# Patient Record
Sex: MALE | Employment: FULL TIME | URBAN - METROPOLITAN AREA
[De-identification: names, ages, dates, MRNs, and addresses within clinical notes are randomized per-mention and may not be internally consistent; named-entity substitution may affect disease eponyms.]

---

## 2017-01-05 ENCOUNTER — CARE COORDINATION (OUTPATIENT)
Dept: NEUROLOGY | Facility: CLINIC | Age: 47
End: 2017-01-05

## 2017-01-05 NOTE — PROGRESS NOTES
Shahida Elias, Shahida Roew, RYAN                   Tried calling Thomas.  Left a vm asking for a call back.                Previous Messages       ----- Message -----      From: Troy Vizcaino MD      Sent: 1/3/2017  10:17 AM        To: Geovany Perez MD, Shahida Elias RN   Subject: Lab tests                                         He showed for ischemic work test then left on learning how long it would take.  No blood draw at all.  No contact with clinic.  Please call and see if he wishes to reschedule testing and follow up in clinic.  jimmy

## 2017-05-15 ENCOUNTER — TELEPHONE (OUTPATIENT)
Dept: NURSING | Facility: CLINIC | Age: 47
End: 2017-05-15

## 2017-05-15 NOTE — TELEPHONE ENCOUNTER
Call Type: Triage Call    Presenting Problem: Requesting a refill of migraine medication.  Caller not with patient and can't remember which [facility] he uses.  Triage Note:  Guideline Title: Medication Questions - Adult  Recommended Disposition: Speak with Provider or Pharmacist  within 24 hours  Original Inclination: Wanted to speak with a nurse  Override Disposition:  Intended Action: Follow advice given  Physician Contacted: No  Requests refill of prescribed medication without valid refills OR requests refill  of prescribed medication with valid refills but does not have prescription number  (no RX container); lack of medication does not put patient at clinical risk ?  YES  Sign(s) or symptom(s) associated with a diagnosed condition or with a new illness  ? NO  Prescription ordered today and not available at pharmacy putting patient at  clinical risk ? NO  Recurrence of a symptom(s) or illness post prescribed medication treatment AND  provider instructed patient to call if symptom(s) returned. ? NO  Unable to obtain prescribed medication related to available resources AND  situation poses immediate clinical risk ? NO  Pharmacy calling to clarify prescription order. ? NO  Requests refill of prescribed medication that does NOT have a valid refill; lack  of medication may cause clinical risk to patient if not available. ? NO  Physician Instructions:  Care Advice:

## 2017-05-16 ENCOUNTER — TELEPHONE (OUTPATIENT)
Dept: FAMILY MEDICINE | Facility: CLINIC | Age: 47
End: 2017-05-16

## 2017-05-16 DIAGNOSIS — G43.009 MIGRAINE WITHOUT AURA AND WITHOUT STATUS MIGRAINOSUS, NOT INTRACTABLE: Primary | ICD-10-CM

## 2017-05-16 RX ORDER — ZOLMITRIPTAN 5 MG/1
5 TABLET, FILM COATED ORAL
Qty: 30 TABLET | Refills: 11 | Status: SHIPPED | OUTPATIENT
Start: 2017-05-16 | End: 2018-09-24

## 2017-05-16 NOTE — TELEPHONE ENCOUNTER
Clinic Action Needed:   Please call back patient to advise ASAP .  Please sent a new Rx for Migraine Rx to [facility] phone [phone] .  ZOLMitriptan (ZOMIG PO)     --   Sig: Take 5 mg by mouth     Reason for Call:  Randi called requesting Rx refill and [facility] has original Rx and left message also .   Patient Recommendations/Teaching: Advised may need to go to Urgent Care or see provider to get new Rx in a  timely way .   Routed to:Sent to PCP's nurse pool.   Renée Jenkins RN, [facility]

## 2017-05-16 NOTE — TELEPHONE ENCOUNTER
ZOLMitriptan (ZOMIG PO)      Last Written Prescription Date:  ?  Last Fill Quantity: ?,   # refills: ?  Last Office Visit with G, P or [facility] prescribing provider: 9/16/2016  Future Office visit:       Routing refill request to provider for review/approval because:  Medication is reported/historical

## 2017-05-17 NOTE — TELEPHONE ENCOUNTER
Notified patient that script was signed and submitted to pharmacy with available refills.     Caitlin Hope- [facility]

## 2017-05-17 NOTE — TELEPHONE ENCOUNTER
Patient called back, has 1/2 pill left is in the middle of migraine episode any questions call cell , patient concerned about getting this filled today

## 2017-09-18 ENCOUNTER — OFFICE VISIT (OUTPATIENT)
Dept: FAMILY MEDICINE | Facility: CLINIC | Age: 47
End: 2017-09-18
Payer: COMMERCIAL

## 2017-09-18 VITALS
OXYGEN SATURATION: 100 % | TEMPERATURE: 97.3 F | WEIGHT: 168 LBS | SYSTOLIC BLOOD PRESSURE: 120 MMHG | HEART RATE: 58 BPM | HEIGHT: 73 IN | BODY MASS INDEX: 22.26 KG/M2 | DIASTOLIC BLOOD PRESSURE: 78 MMHG

## 2017-09-18 DIAGNOSIS — Z12.5 SCREENING FOR PROSTATE CANCER: ICD-10-CM

## 2017-09-18 DIAGNOSIS — Z00.00 ROUTINE GENERAL MEDICAL EXAMINATION AT A HEALTH CARE FACILITY: Primary | ICD-10-CM

## 2017-09-18 DIAGNOSIS — Z13.220 LIPID SCREENING: ICD-10-CM

## 2017-09-18 DIAGNOSIS — G43.009 MIGRAINE WITHOUT AURA AND WITHOUT STATUS MIGRAINOSUS, NOT INTRACTABLE: ICD-10-CM

## 2017-09-18 LAB
ALBUMIN SERPL-MCNC: 4 G/DL (ref 3.4–5)
ALP SERPL-CCNC: 57 U/L (ref 40–150)
ALT SERPL W P-5'-P-CCNC: 34 U/L (ref 0–70)
ANION GAP SERPL CALCULATED.3IONS-SCNC: 7 MMOL/L (ref 3–14)
AST SERPL W P-5'-P-CCNC: 23 U/L (ref 0–45)
BILIRUB SERPL-MCNC: 0.7 MG/DL (ref 0.2–1.3)
BUN SERPL-MCNC: 19 MG/DL (ref 7–30)
CALCIUM SERPL-MCNC: 9.2 MG/DL (ref 8.5–10.1)
CHLORIDE SERPL-SCNC: 104 MMOL/L (ref 94–109)
CHOLEST SERPL-MCNC: 187 MG/DL
CO2 SERPL-SCNC: 29 MMOL/L (ref 20–32)
CREAT SERPL-MCNC: 1 MG/DL (ref 0.66–1.25)
ERYTHROCYTE [DISTWIDTH] IN BLOOD BY AUTOMATED COUNT: 12.7 % (ref 10–15)
GFR SERPL CREATININE-BSD FRML MDRD: 80 ML/MIN/1.7M2
GLUCOSE SERPL-MCNC: 109 MG/DL (ref 70–99)
HCT VFR BLD AUTO: 43.7 % (ref 40–53)
HDLC SERPL-MCNC: 66 MG/DL
HGB BLD-MCNC: 14.7 G/DL (ref 13.3–17.7)
LDLC SERPL CALC-MCNC: 113 MG/DL
MCH RBC QN AUTO: 31.3 PG (ref 26.5–33)
MCHC RBC AUTO-ENTMCNC: 33.6 G/DL (ref 31.5–36.5)
MCV RBC AUTO: 93 FL (ref 78–100)
NONHDLC SERPL-MCNC: 121 MG/DL
PLATELET # BLD AUTO: 184 10E9/L (ref 150–450)
POTASSIUM SERPL-SCNC: 4.3 MMOL/L (ref 3.4–5.3)
PROT SERPL-MCNC: 7.2 G/DL (ref 6.8–8.8)
PSA SERPL-ACNC: 0.43 UG/L (ref 0–4)
RBC # BLD AUTO: 4.69 10E12/L (ref 4.4–5.9)
SODIUM SERPL-SCNC: 140 MMOL/L (ref 133–144)
TRIGL SERPL-MCNC: 42 MG/DL
WBC # BLD AUTO: 4.7 10E9/L (ref 4–11)

## 2017-09-18 PROCEDURE — 80061 LIPID PANEL: CPT | Performed by: INTERNAL MEDICINE

## 2017-09-18 PROCEDURE — 85027 COMPLETE CBC AUTOMATED: CPT | Performed by: INTERNAL MEDICINE

## 2017-09-18 PROCEDURE — 36415 COLL VENOUS BLD VENIPUNCTURE: CPT | Performed by: INTERNAL MEDICINE

## 2017-09-18 PROCEDURE — 80053 COMPREHEN METABOLIC PANEL: CPT | Performed by: INTERNAL MEDICINE

## 2017-09-18 PROCEDURE — G0103 PSA SCREENING: HCPCS | Performed by: INTERNAL MEDICINE

## 2017-09-18 PROCEDURE — 99396 PREV VISIT EST AGE 40-64: CPT | Performed by: INTERNAL MEDICINE

## 2017-09-18 NOTE — PROGRESS NOTES
SUBJECTIVE:   CC: Thomas Yip is an 46 year old male who presents for preventative health visit.     Healthy Habits:    Do you get at least three servings of calcium containing foods daily (dairy, green leafy vegetables, etc.)? yes    Amount of exercise or daily activities, outside of work: 3-4 day(s) per week    Problems taking medications regularly No    Medication side effects: No    Have you had an eye exam in the past two years? yes    Do you see a dentist twice per year? yes    Do you have sleep apnea, excessive snoring or daytime drowsiness?no        Today's PHQ-2 Score:   PHQ-2 ( 1999 [employer]) 9/18/2017 9/16/2016   Q1: Little interest or pleasure in doing things 0 0   Q2: Feeling down, depressed or hopeless 0 0   PHQ-2 Score 0 0         Abuse: Current or Past(Physical, Sexual or Emotional)- No  Do you feel safe in your environment - Yes  Social History   Substance Use Topics     Smoking status: Never Smoker     Smokeless tobacco: Never Used     Alcohol use Yes      Comment: maybe a drink on weekend     The patient does not drink >3 drinks per day nor >7 drinks per week.    Last PSA: No results found for: PSA    Reviewed orders with patient. Reviewed health maintenance and updated orders accordingly - Yes  Patient Active Problem List   Diagnosis     Myalgia and myositis     Migraine without aura and without status migrainosus, not intractable     Past Surgical History:   Procedure Laterality Date     ORTHOPEDIC SURGERY Right     ACL/MCL/Meniscus       Social History   Substance Use Topics     Smoking status: Never Smoker     Smokeless tobacco: Never Used     Alcohol use Yes      Comment: maybe a drink on weekend     Family History   Problem Relation Age of Onset     Breast Cancer Mother      Arthritis Mother      Hypertension Father          Current Outpatient Prescriptions   Medication Sig Dispense Refill     ZOLMitriptan (ZOMIG) 5 MG tablet Take 1 tablet (5 mg) by mouth at onset of headache for migraine  "30 tablet 11     No Known Allergies      Reviewed and updated as needed this visit by clinical staff  Tobacco  Allergies  Med Hx  Surg Hx  Fam Hx  Soc Hx        Reviewed and updated as needed this visit by Provider  Tobacco  Med Hx  Surg Hx  Fam Hx  Soc Hx       Past Medical History:   Diagnosis Date     Migraine      Myalgia and myositis 9/24/2014      Past Surgical History:   Procedure Laterality Date     ORTHOPEDIC SURGERY Right     ACL/MCL/Meniscus       ROS:  A 12 organ systems ROS is negative    OBJECTIVE:   /78 (BP Location: Right arm, Patient Position: Chair, Cuff Size: Adult Regular)  Pulse 58  Temp 97.3  F (36.3  C) (Oral)  Ht 6' 0.5\" (1.842 m)  Wt 168 lb (76.2 kg)  SpO2 100%  BMI 22.47 kg/m2  EXAM:  GENERAL: healthy, alert and no distress  EYES: Eyes grossly normal to inspection, PERRL and conjunctivae and sclerae normal  HENT: ear canals and TM's normal, nose and mouth without ulcers or lesions  NECK: no adenopathy, no asymmetry, masses, or scars and thyroid normal to palpation  RESP: lungs clear to auscultation - no rales, rhonchi or wheezes  CV: regular rate and rhythm, normal S1 S2, no S3 or S4, no murmur, click or rub, no peripheral edema and peripheral pulses strong  ABDOMEN: soft, nontender, no hepatosplenomegaly, no masses and bowel sounds normal  RECTAL: normal sphincter tone, no rectal masses, prostate normal size, smooth, nontender without nodules or masses  MS: no gross musculoskeletal defects noted, no edema  SKIN: no suspicious lesions or rashes  NEURO: Normal strength and tone, mentation intact and speech normal  PSYCH: mentation appears normal, affect normal/bright    ASSESSMENT/PLAN:   1. Routine general medical examination at a health care facility    - Comprehensive metabolic panel  - CBC with platelets    2. Migraine without aura and without status migrainosus, not intractable  Continue abortive zomig; he does not want prophylaxis     3. Lipid screening    - Lipid " "panel reflex to direct LDL    4. Screening for prostate cancer    - Prostate spec antigen screen    COUNSELING:  Reviewed preventive health counseling, as reflected in patient instructions  Special attention given to:        Regular exercise       Healthy diet/nutrition       Immunizations    Declined: Influenza due to Other will get it at work              Colon cancer screening       Prostate cancer screening      BP Screening:   Last 3 BP Readings:    BP Readings from Last 3 Encounters:   09/18/17 120/78   09/16/16 130/78   09/24/15 132/79       The following was recommended to the patient:  Re-screen BP within a year and recommended lifestyle modifications       reports that he has never smoked. He has never used smokeless tobacco.      Estimated body mass index is 22.47 kg/(m^2) as calculated from the following:    Height as of this encounter: 6' 0.5\" (1.842 m).    Weight as of this encounter: 168 lb (76.2 kg).         Counseling Resources:  ATP IV Guidelines  Pooled Cohorts Equation Calculator  FRAX Risk Assessment  ICSI Preventive Guidelines  Dietary Guidelines for Americans, 2010  USDA's MyPlate  ASA Prophylaxis  Lung CA Screening    Haile Solomon MD  [facility]  "

## 2017-09-18 NOTE — MR AVS SNAPSHOT
After Visit Summary   9/18/2017    Thomas Yip    MRN: [id number]           Patient Information     Date Of Birth          1970        Visit Information        Provider Department      9/18/2017 8:00 AM Haile Solomon MD [facility]        Today's Diagnoses     Routine general medical examination at a health care facility    -  1    Migraine without aura and without status migrainosus, not intractable        Lipid screening        Screening for prostate cancer          Care Instructions      Preventive Health Recommendations  Male Ages 40 to 49    Yearly exam:             See your health care provider every year in order to  o   Review health changes.   o   Discuss preventive care.    o   Review your medicines if your doctor has prescribed any.    You should be tested each year for STDs (sexually transmitted diseases) if you re at risk.     Have a cholesterol test every 5 years.     Have a colonoscopy (test for colon cancer) if someone in your family has had colon cancer or polyps before age 50.     After age 45, have a diabetes test (fasting glucose). If you are at risk for diabetes, you should have this test every 3 years.      Talk with your health care provider about whether or not a prostate cancer screening test (PSA) is right for you.    Shots: Get a flu shot each year. Get a tetanus shot every 10 years.     Nutrition:    Eat at least 5 servings of fruits and vegetables daily.     Eat whole-grain bread, whole-wheat pasta and brown rice instead of white grains and rice.     Talk to your provider about Calcium and Vitamin D.     Lifestyle    Exercise for at least 150 minutes a week (30 minutes a day, 5 days a week). This will help you control your weight and prevent disease.     Limit alcohol to one drink per day.     No smoking.     Wear sunscreen to prevent skin cancer.     See your dentist every six months for an exam and cleaning.              Follow-ups after your visit    "     Follow-up notes from your care team     Return in about 1 year (around 2018) for Physical Exam.      Who to contact     If you have questions or need follow up information about today's clinic visit or your schedule please contact [facility] directly at [phone].  Normal or non-critical lab and imaging results will be communicated to you by MyChart, letter or phone within 4 business days after the clinic has received the results. If you do not hear from us within 7 days, please contact the clinic through [facility] or phone. If you have a critical or abnormal lab result, we will notify you by phone as soon as possible.  Submit refill requests through [employer] or call your pharmacy and they will forward the refill request to us. Please allow 3 business days for your refill to be completed.          Additional Information About Your Visit        MyChart Information     [employer] lets you send messages to your doctor, view your test results, renew your prescriptions, schedule appointments and more. To sign up, go to [email or web address] . Click on \"Log in\" on the left side of the screen, which will take you to the Welcome page. Then click on \"Sign up Now\" on the right side of the page.     You will be asked to enter the access code listed below, as well as some personal information. Please follow the directions to create your username and password.     Your access code is: [email or web address]  Expires: 2017  8:41 AM     Your access code will  in 90 days. If you need help or a new code, please call your [facility] clinic or [phone].        Care EveryWhere ID     This is your Care EveryWhere ID. This could be used by other organizations to access your [facility] medical records  [id number]        Your Vitals Were     Pulse Temperature Height Pulse Oximetry BMI (Body Mass Index)       58 97.3  F (36.3  C) (Oral) 6' 0.5\" (1.842 m) 100% 22.47 kg/m2        Blood Pressure from Last 3 Encounters: "   09/18/17 120/78   09/16/16 130/78   09/24/15 132/79    Weight from Last 3 Encounters:   09/18/17 168 lb (76.2 kg)   09/16/16 168 lb (76.2 kg)   09/24/15 164 lb (74.4 kg)              We Performed the Following     CBC with platelets     Comprehensive metabolic panel     Lipid panel reflex to direct LDL     Prostate spec antigen screen        Primary Care Provider Office Phone # Fax #    Haile Solomon [phone]       [facility] [address] [place] [address] [address]  [facility] [address]        Equal Access to Services     [facility] : Hadii aad ku hadasho Soomaali, waaxda luqadaha, qaybta kaalmada adeegyada, rosalva garcia hayjayna gilmore . So [facility] [phone].    ATENCIÓN: Si habla español, tiene a loving disposición servicios gratuitos de asistencia lingüística. Llame al [phone].    We comply with applicable federal civil rights laws and [place] laws. We do not discriminate on the basis of race, color, national origin, age, disability sex, sexual orientation or gender identity.            Thank you!     Thank you for choosing [facility]  for your care. Our goal is always to provide you with excellent care. Hearing back from our patients is one way we can continue to improve our services. Please take a few minutes to complete the written survey that you may receive in the mail after your visit with us. Thank you!             Your Updated Medication List - Protect others around you: Learn how to safely use, store and throw away your medicines at www.disposemymeds.org.          This list is accurate as of: 9/18/17  8:41 AM.  Always use your most recent med list.                   Brand Name Dispense Instructions for use Diagnosis    ZOLMitriptan 5 MG tablet    ZOMIG    30 tablet    Take 1 tablet (5 mg) by mouth at onset of headache for migraine    Migraine without aura and without status migrainosus, not intractable

## 2017-09-18 NOTE — NURSING NOTE
"Chief Complaint   Patient presents with     Physical       Initial /78 (BP Location: Right arm, Patient Position: Chair, Cuff Size: Adult Regular)  Pulse 58  Temp 97.3  F (36.3  C) (Oral)  Ht 6' 0.5\" (1.842 m)  Wt 168 lb (76.2 kg)  SpO2 100%  BMI 22.47 kg/m2 Estimated body mass index is 22.47 kg/(m^2) as calculated from the following:    Height as of this encounter: 6' 0.5\" (1.842 m).    Weight as of this encounter: 168 lb (76.2 kg).  Medication Reconciliation: complete  "

## 2017-09-18 NOTE — LETTER
[address]  [address]  [place]  [address]  Tel: [phone]    September 19, 2017    Thomas Yip  [address] [facility] [address]        Dear Mr. Yip,    The following letter pertains to your most recent diagnostic tests:    -Your prostate specific antigen (PSA) test result returned normal.     -Liver and gallbladder tests are normal for you. (ALT,AST, Alk phos, bilirubin), kidney function is normal for you (Creatinine, GFR), Sodium is normal, Potassium is normal for you, Calcium is normal for you    -Your Glucose (blood sugar) is mildly elevated, but NOT in the diabetic range.     -Your cholesterol panel looks healthy. Your goal LDL level is that less than 160.      -Your complete blood counts including your hemoglobin returned normal for you.         Bottom line:  Your lab results look OK.  We will keep on eye on your blood sugar annually.        Follow up:  Schedule an appointment for a physical examination with fasting blood tests in one year's time, or return sooner if new questions, symptoms or problems arise.     Sincerely,    Haile Solomon MD/SML      Enclosure: Lab Results  Results for orders placed or performed in visit on 09/18/17   Prostate spec antigen screen   Result Value Ref Range    PSA 0.43 0 - 4 ug/L   Lipid panel reflex to direct LDL   Result Value Ref Range    Cholesterol 187 <200 mg/dL    Triglycerides 42 <150 mg/dL    HDL Cholesterol 66 >39 mg/dL    LDL Cholesterol Calculated 113 (H) <100 mg/dL    Non HDL Cholesterol 121 <130 mg/dL   Comprehensive metabolic panel   Result Value Ref Range    Sodium 140 133 - 144 mmol/L    Potassium 4.3 3.4 - 5.3 mmol/L    Chloride 104 94 - 109 mmol/L    Carbon Dioxide 29 20 - 32 mmol/L    Anion Gap 7 3 - 14 mmol/L    Glucose 109 (H) 70 - 99 mg/dL    Urea Nitrogen 19 7 - 30 mg/dL    Creatinine 1.00 0.66 - 1.25 mg/dL    GFR Estimate 80 >60 mL/min/1.7m2    GFR Estimate If Black >90 >60 mL/min/1.7m2    Calcium 9.2 8.5 -  10.1 mg/dL    Bilirubin Total 0.7 0.2 - 1.3 mg/dL    Albumin 4.0 3.4 - 5.0 g/dL    Protein Total 7.2 6.8 - 8.8 g/dL    Alkaline Phosphatase 57 40 - 150 U/L    ALT 34 0 - 70 U/L    AST 23 0 - 45 U/L   CBC with platelets   Result Value Ref Range    WBC 4.7 4.0 - 11.0 10e9/L    RBC Count 4.69 4.4 - 5.9 10e12/L    Hemoglobin 14.7 13.3 - 17.7 g/dL    Hematocrit 43.7 40.0 - 53.0 %    MCV 93 78 - 100 fl    MCH 31.3 26.5 - 33.0 pg    MCHC 33.6 31.5 - 36.5 g/dL    RDW 12.7 10.0 - 15.0 %    Platelet Count 184 150 - 450 10e9/L

## 2017-09-19 NOTE — PROGRESS NOTES
The following letter pertains to your most recent diagnostic tests:    -Your prostate specific antigen (PSA) test result returned normal.     -Liver and gallbladder tests are normal for you. (ALT,AST, Alk phos, bilirubin), kidney function is normal for you (Creatinine, GFR), Sodium is normal, Potassium is normal for you, Calcium is normal for you    -Your Glucose (blood sugar) is mildly elevated, but NOT in the diabetic range.     -Your cholesterol panel looks healthy. Your goal LDL level is that less than 160.      -Your complete blood counts including your hemoglobin returned normal for you.         Bottom line:  Your lab results look OK.  We will keep on eye on your blood sugar annually.        Follow up:  Schedule an appointment for a physical examination with fasting blood tests in one year's time, or return sooner if new questions, symptoms or problems arise.       Sincerely,    Dr. Solomon

## 2017-10-16 ENCOUNTER — TELEPHONE (OUTPATIENT)
Dept: FAMILY MEDICINE | Facility: CLINIC | Age: 47
End: 2017-10-16

## 2017-10-16 NOTE — TELEPHONE ENCOUNTER
Reason for Call:  Other     Detailed comments: Well at work form left in box, patient  Would like this mailed back to him when completed    Phone Number Patient can be reached at: Home number on file [phone] (home)    Best Time: anytime    Can we leave a detailed message on this number? YES    Call taken on 10/16/2017 at 11:20 AM by Magda Aragon

## 2018-09-24 ENCOUNTER — OFFICE VISIT (OUTPATIENT)
Dept: FAMILY MEDICINE | Facility: CLINIC | Age: 48
End: 2018-09-24
Payer: COMMERCIAL

## 2018-09-24 VITALS
HEART RATE: 61 BPM | DIASTOLIC BLOOD PRESSURE: 77 MMHG | WEIGHT: 167 LBS | HEIGHT: 72 IN | OXYGEN SATURATION: 100 % | BODY MASS INDEX: 22.62 KG/M2 | SYSTOLIC BLOOD PRESSURE: 118 MMHG | TEMPERATURE: 97.3 F

## 2018-09-24 DIAGNOSIS — Z11.4 SCREENING FOR HIV (HUMAN IMMUNODEFICIENCY VIRUS): ICD-10-CM

## 2018-09-24 DIAGNOSIS — Z12.11 COLON CANCER SCREENING: ICD-10-CM

## 2018-09-24 DIAGNOSIS — Z00.00 ROUTINE GENERAL MEDICAL EXAMINATION AT A HEALTH CARE FACILITY: Primary | ICD-10-CM

## 2018-09-24 DIAGNOSIS — G43.009 MIGRAINE WITHOUT AURA AND WITHOUT STATUS MIGRAINOSUS, NOT INTRACTABLE: ICD-10-CM

## 2018-09-24 DIAGNOSIS — Z12.5 PROSTATE CANCER SCREENING: ICD-10-CM

## 2018-09-24 LAB
ALBUMIN SERPL-MCNC: 4.2 G/DL (ref 3.4–5)
ALP SERPL-CCNC: 58 U/L (ref 40–150)
ALT SERPL W P-5'-P-CCNC: 31 U/L (ref 0–70)
ANION GAP SERPL CALCULATED.3IONS-SCNC: 3 MMOL/L (ref 3–14)
AST SERPL W P-5'-P-CCNC: 25 U/L (ref 0–45)
BILIRUB SERPL-MCNC: 0.7 MG/DL (ref 0.2–1.3)
BUN SERPL-MCNC: 14 MG/DL (ref 7–30)
CALCIUM SERPL-MCNC: 9.4 MG/DL (ref 8.5–10.1)
CHLORIDE SERPL-SCNC: 103 MMOL/L (ref 94–109)
CHOLEST SERPL-MCNC: 190 MG/DL
CO2 SERPL-SCNC: 32 MMOL/L (ref 20–32)
CREAT SERPL-MCNC: 1.03 MG/DL (ref 0.66–1.25)
ERYTHROCYTE [DISTWIDTH] IN BLOOD BY AUTOMATED COUNT: 12.7 % (ref 10–15)
GFR SERPL CREATININE-BSD FRML MDRD: 77 ML/MIN/1.7M2
GLUCOSE SERPL-MCNC: 107 MG/DL (ref 70–99)
HCT VFR BLD AUTO: 46.7 % (ref 40–53)
HDLC SERPL-MCNC: 65 MG/DL
HGB BLD-MCNC: 15.6 G/DL (ref 13.3–17.7)
LDLC SERPL CALC-MCNC: 111 MG/DL
MCH RBC QN AUTO: 31 PG (ref 26.5–33)
MCHC RBC AUTO-ENTMCNC: 33.4 G/DL (ref 31.5–36.5)
MCV RBC AUTO: 93 FL (ref 78–100)
NONHDLC SERPL-MCNC: 125 MG/DL
PLATELET # BLD AUTO: 213 10E9/L (ref 150–450)
POTASSIUM SERPL-SCNC: 4.7 MMOL/L (ref 3.4–5.3)
PROT SERPL-MCNC: 7.8 G/DL (ref 6.8–8.8)
PSA SERPL-ACNC: 0.46 UG/L (ref 0–4)
RBC # BLD AUTO: 5.03 10E12/L (ref 4.4–5.9)
SODIUM SERPL-SCNC: 138 MMOL/L (ref 133–144)
TRIGL SERPL-MCNC: 70 MG/DL
WBC # BLD AUTO: 4.1 10E9/L (ref 4–11)

## 2018-09-24 PROCEDURE — 99396 PREV VISIT EST AGE 40-64: CPT | Performed by: INTERNAL MEDICINE

## 2018-09-24 PROCEDURE — G0103 PSA SCREENING: HCPCS | Performed by: INTERNAL MEDICINE

## 2018-09-24 PROCEDURE — 36415 COLL VENOUS BLD VENIPUNCTURE: CPT | Performed by: INTERNAL MEDICINE

## 2018-09-24 PROCEDURE — 85027 COMPLETE CBC AUTOMATED: CPT | Performed by: INTERNAL MEDICINE

## 2018-09-24 PROCEDURE — 87389 HIV-1 AG W/HIV-1&-2 AB AG IA: CPT | Performed by: INTERNAL MEDICINE

## 2018-09-24 PROCEDURE — 80061 LIPID PANEL: CPT | Performed by: INTERNAL MEDICINE

## 2018-09-24 PROCEDURE — 80053 COMPREHEN METABOLIC PANEL: CPT | Performed by: INTERNAL MEDICINE

## 2018-09-24 RX ORDER — ZOLMITRIPTAN 5 MG/1
5 TABLET, FILM COATED ORAL
Qty: 30 TABLET | Refills: 11 | Status: SHIPPED | OUTPATIENT
Start: 2018-09-24 | End: 2019-09-27

## 2018-09-24 NOTE — PROGRESS NOTES
SUBJECTIVE:   CC: Thomas Yip is an 47 year old male who presents for preventative health visit.     Healthy Habits:    Do you get at least three servings of calcium containing foods daily (dairy, green leafy vegetables, etc.)? yes    Amount of exercise or daily activities, outside of work: 3-4 day(s) per week    Problems taking medications regularly No    Medication side effects: No    Have you had an eye exam in the past two years? yes    Do you see a dentist twice per year? yes    Do you have sleep apnea, excessive snoring or daytime drowsiness?no         Today's PHQ-2 Score:   PHQ-2 ( 1999 [employer]) 9/24/2018 9/18/2017   Q1: Little interest or pleasure in doing things 0 0   Q2: Feeling down, depressed or hopeless 0 0   PHQ-2 Score 0 0       Abuse: Current or Past(Physical, Sexual or Emotional)- No  Do you feel safe in your environment - Yes    Social History   Substance Use Topics     Smoking status: Never Smoker     Smokeless tobacco: Never Used     Alcohol use Yes      Comment: maybe a drink on weekend      If you drink alcohol do you typically have >3 drinks per day or >7 drinks per week? No                      Last PSA:   PSA   Date Value Ref Range Status   09/18/2017 0.43 0 - 4 ug/L Final     Comment:     Assay Method:  Chemiluminescence using Siemens Vista analyzer       Reviewed orders with patient. Reviewed health maintenance and updated orders accordingly - Yes  Patient Active Problem List   Diagnosis     Myalgia and myositis     Migraine without aura and without status migrainosus, not intractable     Past Surgical History:   Procedure Laterality Date     ORTHOPEDIC SURGERY Right     ACL/MCL/Meniscus       Social History   Substance Use Topics     Smoking status: Never Smoker     Smokeless tobacco: Never Used     Alcohol use Yes      Comment: maybe a drink on weekend     Family History   Problem Relation Age of Onset     Breast Cancer Mother      Arthritis Mother      Hypertension Father       "    Current Outpatient Prescriptions   Medication Sig Dispense Refill     ZOLMitriptan (ZOMIG) 5 MG tablet Take 1 tablet (5 mg) by mouth at onset of headache for migraine 30 tablet 11     No Known Allergies    Reviewed and updated as needed this visit by clinical staff  Tobacco  Allergies  Meds  Med Hx  Surg Hx  Fam Hx  Soc Hx        Reviewed and updated as needed this visit by Provider  Tobacco  Med Hx  Surg Hx  Fam Hx  Soc Hx       Past Medical History:   Diagnosis Date     Migraine      Myalgia and myositis 9/24/2014      Past Surgical History:   Procedure Laterality Date     ORTHOPEDIC SURGERY Right     ACL/MCL/Meniscus       ROS:  A 10 organ systems ROS is negative.     OBJECTIVE:   /77 (BP Location: Right arm, Cuff Size: Adult Regular)  Pulse 61  Temp 97.3  F (36.3  C) (Tympanic)  Ht 6' 0.25\" (1.835 m)  Wt 167 lb (75.8 kg)  SpO2 100%  BMI 22.49 kg/m2  EXAM:  GENERAL: healthy, alert and no distress  EYES: Eyes grossly normal to inspection, PERRL and conjunctivae and sclerae normal  HENT: ear canals and TM's normal, nose and mouth without ulcers or lesions  NECK: no adenopathy, no asymmetry, masses, or scars and thyroid normal to palpation  RESP: lungs clear to auscultation - no rales, rhonchi or wheezes  CV: regular rate and rhythm, normal S1 S2, no S3 or S4, no murmur, click or rub, no peripheral edema and peripheral pulses strong  ABDOMEN: soft, nontender, no hepatosplenomegaly, no masses and bowel sounds normal  RECTAL: normal sphincter tone, no rectal masses, prostate normal size, smooth, nontender without nodules or masses  MS: no gross musculoskeletal defects noted, no edema  SKIN: no suspicious lesions or rashes  NEURO: Normal strength and tone, mentation intact and speech normal  PSYCH: mentation appears normal, affect normal/bright    Diagnostic Test Results:  Labs pending     ASSESSMENT/PLAN:   1. Routine general medical examination at a health care facility    - Lipid panel " "reflex to direct LDL Fasting  - Comprehensive metabolic panel  - CBC with platelets    2. Migraine without aura and without status migrainosus, not intractable    - ZOLMitriptan (ZOMIG) 5 MG tablet; Take 1 tablet (5 mg) by mouth at onset of headache for migraine  Dispense: 30 tablet; Refill: 11    3. Colon cancer screening    - GASTROENTEROLOGY ADULT REF PROCEDURE ONLY [facility] Griss[facility] [phone]    4. Screening for HIV (human immunodeficiency virus)    - HIV Screening    5. Prostate cancer screening    - Prostate spec antigen screen    COUNSELING:  Reviewed preventive health counseling, as reflected in patient instructions  Special attention given to:        Regular exercise       Healthy diet/nutrition       Immunizations    Declined: Influenza due to Other will get it at work                Consider Hep C screening for patients born between 1945 and 1965       HIV screeninx in teen years, 1x in adult years, and at intervals if high risk       Colon cancer screening       Prostate cancer screening    BP Readings from Last 1 Encounters:   18 118/77     Estimated body mass index is 22.49 kg/(m^2) as calculated from the following:    Height as of this encounter: 6' 0.25\" (1.835 m).    Weight as of this encounter: 167 lb (75.8 kg).           reports that he has never smoked. He has never used smokeless tobacco.      Counseling Resources:  ATP IV Guidelines  Pooled Cohorts Equation Calculator  FRAX Risk Assessment  ICSI Preventive Guidelines  Dietary Guidelines for Americans,   [employer] MyPlate  ASA Prophylaxis  Lung CA Screening    Haile Solomon MD  [facility]    The 10-year ASCVD risk score (Karena PRESLEY Jr, et al., 2013) is: 1.8%    Values used to calculate the score:      Age: 47 years      Sex: Male      Is Non- : No      Diabetic: No      Tobacco smoker: No      Systolic Blood Pressure: 118 mmHg      Is BP treated: No      HDL Cholesterol: 66 mg/dL      Total " Cholesterol: 187 mg/dL

## 2018-09-24 NOTE — MR AVS SNAPSHOT
After Visit Summary   9/24/2018    Thomas Yip    MRN: [id number]           Patient Information     Date Of Birth          1970        Visit Information        Provider Department      9/24/2018 9:00 AM Haile Solomon MD [facility]        Today's Diagnoses     Routine general medical examination at a health care facility    -  1    Migraine without aura and without status migrainosus, not intractable        Colon cancer screening        Screening for HIV (human immunodeficiency virus)        Prostate cancer screening          Care Instructions      Preventive Health Recommendations  Male Ages 40 to 49    Yearly exam:             See your health care provider every year in order to  o   Review health changes.   o   Discuss preventive care.    o   Review your medicines if your doctor has prescribed any.    You should be tested each year for STDs (sexually transmitted diseases) if you re at risk.     Have a cholesterol test every 5 years.     Have a colonoscopy (test for colon cancer) if someone in your family has had colon cancer or polyps before age 50.     After age 45, have a diabetes test (fasting glucose). If you are at risk for diabetes, you should have this test every 3 years.      Talk with your health care provider about whether or not a prostate cancer screening test (PSA) is right for you.    Shots: Get a flu shot each year. Get a tetanus shot every 10 years.     Nutrition:    Eat at least 5 servings of fruits and vegetables daily.     Eat whole-grain bread, whole-wheat pasta and brown rice instead of white grains and rice.     Get adequate Calcium and Vitamin D.     Lifestyle    Exercise for at least 150 minutes a week (30 minutes a day, 5 days a week). This will help you control your weight and prevent disease.     Limit alcohol to one drink per day.     No smoking.     Wear sunscreen to prevent skin cancer.     See your dentist every six months for an exam and  "cleaning.              Follow-ups after your visit        Additional Services     GASTROENTEROLOGY ADULT REF PROCEDURE ONLY [facility] Griss[facility] [phone]                 Follow-up notes from your care team     Return in about 1 year (around 9/24/2019) for Preventive Visit.      Who to contact     If you have questions or need follow up information about today's clinic visit or your schedule please contact [facility] directly at [phone].  Normal or non-critical lab and imaging results will be communicated to you by [facility], letter or phone within 4 business days after the clinic has received the results. If you do not hear from us within 7 days, please contact the clinic through [employer] or phone. If you have a critical or abnormal lab result, we will notify you by phone as soon as possible.  Submit refill requests through [employer] or call your pharmacy and they will forward the refill request to us. Please allow 3 business days for your refill to be completed.          Additional Information About Your Visit        [facility] Information     [employer] gives you secure access to your electronic health record. If you see a primary care provider, you can also send messages to your care team and make appointments. If you have questions, please call your primary care clinic.  If you do not have a primary care provider, please call [phone] and they will assist you.        Care EveryWhere ID     This is your Care EveryWhere ID. This could be used by other organizations to access your [facility] medical records  [id number]        Your Vitals Were     Pulse Temperature Height Pulse Oximetry BMI (Body Mass Index)       61 97.3  F (36.3  C) (Tympanic) 6' 0.25\" (1.835 m) 100% 22.49 kg/m2        Blood Pressure from Last 3 Encounters:   09/24/18 118/77   09/18/17 120/78   09/16/16 130/78    Weight from Last 3 Encounters:   09/24/18 167 lb (75.8 kg)   09/18/17 168 lb (76.2 kg)   09/16/16 168 lb (76.2 kg)    "           We Performed the Following     CBC with platelets     Comprehensive metabolic panel     GASTROENTEROLOGY ADULT REF PROCEDURE ONLY Iv[facility] Grissom [phone]     HIV Screening     Lipid panel reflex to direct LDL Fasting     Prostate spec antigen screen          Where to get your medicines      These medications were sent to [id number] - [place], [place] - [address] AT [facility] OF [address]  [address], [place] [address]     Phone:  [phone]     ZOLMitriptan 5 MG tablet          Primary Care Provider Office Phone # Fax #    Haile Solomon [phone] [address]  [place] [address]        Equal Access to Services     CLAUDINE QUIROS : Hadii aad ku hadasho Soomaali, waaxda luqadaha, qaybta kaalmada adeegyada, waxay avisin hayjayna gilmore . So [facility] [phone].    ATENCIÓN: Si habla español, tiene a loving disposición servicios gratuitos de asistencia lingüística. [facility] [phone].    We comply with applicable federal civil rights laws and [place] laws. We do not discriminate on the basis of race, color, national origin, age, disability, sex, sexual orientation, or gender identity.            Thank you!     Thank you for choosing [facility]  for your care. Our goal is always to provide you with excellent care. Hearing back from our patients is one way we can continue to improve our services. Please take a few minutes to complete the written survey that you may receive in the mail after your visit with us. Thank you!             Your Updated Medication List - Protect others around you: Learn how to safely use, store and throw away your medicines at www.disposemymeds.org.          This list is accurate as of 9/24/18  9:42 AM.  Always use your most recent med list.                   Brand Name Dispense Instructions for use Diagnosis    ZOLMitriptan 5 MG tablet    ZOMIG    30 tablet    Take 1 tablet (5 mg) by  mouth at onset of headache for migraine    Migraine without aura and without status migrainosus, not intractable

## 2018-09-25 LAB — HIV 1+2 AB+HIV1 P24 AG SERPL QL IA: NONREACTIVE

## 2018-09-26 NOTE — PROGRESS NOTES
"The following letter pertains to your most recent diagnostic tests:    -Your HIV test is negative.     -Your prostate specific antigen (PSA) test result returned normal.     -Your total cholesterol is 190 which is at your goal of total cholesterol less than 200.    -Your triglycerides are 70 which are at your goal of triglycerides less than 150.    -Your HDL or \"good cholesterol\" is 65 which is at your goal of HDL cholesterol greater than 40.    -Your LDL cholesterol or \"bad cholesterol\" is 111 which is at your goal of LDL cholesterol less than <160.  Your LDL goal is based on your risk factors for artery disease.     -Liver and gallbladder tests are normal for you. (ALT,AST, Alk phos, bilirubin), kidney function is normal for you (Creatinine, GFR), Sodium is normal, Potassium is normal for you, Calcium is normal for you, Glucose (blood sugar) is mildly elevated and NOT in the diabetic range.        -Your complete blood counts including your hemoglobin returned normal for you.       Bottom line:        Follow up:  Schedule an appointment for a physical examination with fasting blood tests in one year's time, or return sooner if new questions, symptoms or problems arise.       Sincerely,    Dr. Solomon"

## 2018-12-07 ENCOUNTER — APPOINTMENT (OUTPATIENT)
Dept: SURGERY | Facility: PHYSICIAN GROUP | Age: 48
End: 2018-12-07
Payer: COMMERCIAL

## 2018-12-07 ENCOUNTER — HOSPITAL ENCOUNTER (OUTPATIENT)
Facility: CLINIC | Age: 48
Discharge: HOME | End: 2018-12-07
Attending: SURGERY | Admitting: SURGERY
Payer: COMMERCIAL

## 2018-12-07 VITALS
SYSTOLIC BLOOD PRESSURE: 118 MMHG | HEIGHT: 72 IN | DIASTOLIC BLOOD PRESSURE: 78 MMHG | BODY MASS INDEX: 22.35 KG/M2 | WEIGHT: 165 LBS | RESPIRATION RATE: 16 BRPM | HEART RATE: 69 BPM | OXYGEN SATURATION: 100 %

## 2018-12-07 LAB — COLONOSCOPY: NORMAL

## 2018-12-07 PROCEDURE — G0500 MOD SEDAT ENDO SERVICE >5YRS: HCPCS | Performed by: SURGERY

## 2018-12-07 PROCEDURE — 45380 COLONOSCOPY AND BIOPSY: CPT | Mod: PT | Performed by: SURGERY

## 2018-12-07 PROCEDURE — 88305 TISSUE EXAM BY PATHOLOGIST: CPT | Performed by: SURGERY

## 2018-12-07 PROCEDURE — 25000128 H RX IP 250 OP 636: Performed by: SURGERY

## 2018-12-07 PROCEDURE — 99152 MOD SED SAME PHYS/QHP 5/>YRS: CPT | Mod: 59 | Performed by: SURGERY

## 2018-12-07 PROCEDURE — 88305 TISSUE EXAM BY PATHOLOGIST: CPT | Mod: 26 | Performed by: SURGERY

## 2018-12-07 RX ORDER — LIDOCAINE 40 MG/G
CREAM TOPICAL
Status: DISCONTINUED | OUTPATIENT
Start: 2018-12-07 | End: 2018-12-07 | Stop reason: HOSPADM

## 2018-12-07 RX ORDER — FENTANYL CITRATE 50 UG/ML
INJECTION, SOLUTION INTRAMUSCULAR; INTRAVENOUS PRN
Status: DISCONTINUED | OUTPATIENT
Start: 2018-12-07 | End: 2018-12-07 | Stop reason: HOSPADM

## 2018-12-07 RX ORDER — ONDANSETRON 2 MG/ML
4 INJECTION INTRAMUSCULAR; INTRAVENOUS
Status: DISCONTINUED | OUTPATIENT
Start: 2018-12-07 | End: 2018-12-07 | Stop reason: HOSPADM

## 2018-12-12 LAB — COPATH REPORT: NORMAL

## 2019-04-11 ENCOUNTER — OFFICE VISIT (OUTPATIENT)
Dept: FAMILY MEDICINE | Facility: CLINIC | Age: 49
End: 2019-04-11
Payer: COMMERCIAL

## 2019-04-11 VITALS
TEMPERATURE: 98.3 F | OXYGEN SATURATION: 98 % | HEIGHT: 72 IN | SYSTOLIC BLOOD PRESSURE: 127 MMHG | BODY MASS INDEX: 23.16 KG/M2 | DIASTOLIC BLOOD PRESSURE: 79 MMHG | WEIGHT: 171 LBS | HEART RATE: 88 BPM

## 2019-04-11 DIAGNOSIS — J01.90 ACUTE SINUSITIS, RECURRENCE NOT SPECIFIED, UNSPECIFIED LOCATION: ICD-10-CM

## 2019-04-11 DIAGNOSIS — J06.9 UPPER RESPIRATORY TRACT INFECTION, UNSPECIFIED TYPE: Primary | ICD-10-CM

## 2019-04-11 DIAGNOSIS — R05.9 COUGH: ICD-10-CM

## 2019-04-11 PROCEDURE — 99213 OFFICE O/P EST LOW 20 MIN: CPT | Performed by: INTERNAL MEDICINE

## 2019-04-11 RX ORDER — AZITHROMYCIN 250 MG/1
TABLET, FILM COATED ORAL
Qty: 6 TABLET | Refills: 1 | Status: SHIPPED | OUTPATIENT
Start: 2019-04-11 | End: 2019-04-11

## 2019-04-11 RX ORDER — AZITHROMYCIN 250 MG/1
TABLET, FILM COATED ORAL
Qty: 6 TABLET | Refills: 1 | Status: SHIPPED | OUTPATIENT
Start: 2019-04-11 | End: 2019-09-27

## 2019-04-11 RX ORDER — CODEINE PHOSPHATE AND GUAIFENESIN 10; 100 MG/5ML; MG/5ML
1 SOLUTION ORAL 2 TIMES DAILY PRN
Qty: 120 ML | Refills: 1 | Status: SHIPPED | OUTPATIENT
Start: 2019-04-11 | End: 2019-09-27

## 2019-04-11 RX ORDER — METHYLPREDNISOLONE 4 MG
TABLET, DOSE PACK ORAL
Qty: 21 TABLET | Refills: 1 | Status: SHIPPED | OUTPATIENT
Start: 2019-04-11 | End: 2019-09-27

## 2019-04-11 ASSESSMENT — MIFFLIN-ST. JEOR: SCORE: 1673.65

## 2019-04-11 NOTE — PROGRESS NOTES
SUBJECTIVE:   Thomas Yip is a 48 year old male who presents to clinic today for the following   health issues:      RESPIRATORY SYMPTOMS      Duration: 2 weeks    Description  nasal congestion, facial pain/pressure, cough and headache    Severity: moderate    Accompanying signs and symptoms: None    History (predisposing factors):  none    Precipitating or alleviating factors: None    Therapies tried and outcome:  Advil cold and Sinus        Current Medications:     Current Outpatient Medications   Medication Sig Dispense Refill     ZOLMitriptan (ZOMIG) 5 MG tablet Take 1 tablet (5 mg) by mouth at onset of headache for migraine 30 tablet 11         Allergies:      Allergies   Allergen Reactions     No Known Allergies             Past Medical History:     Past Medical History:   Diagnosis Date     Migraine      Myalgia and myositis 9/24/2014         Past Surgical History:     Past Surgical History:   Procedure Laterality Date     COLONOSCOPY N/A 12/7/2018    Procedure: COMBINED COLONOSCOPY, SINGLE OR MULTIPLE BIOPSY/POLYPECTOMY BY BIOPSY;  Surgeon: Baldemar Salazar MD;  Location:  GI     ORTHOPEDIC SURGERY Right     ACL/MCL/Meniscus         Family Medical History:     Family History   Problem Relation Age of Onset     Breast Cancer Mother      Arthritis Mother      Hypertension Father          Social History:     Social History     Socioeconomic History     Marital status:      Spouse name: Not on file     Number of children: Not on file     Years of education: Not on file     Highest education level: Not on file   Occupational History     Not on file   Social Needs     Financial resource strain: Not on file     Food insecurity:     Worry: Not on file     Inability: Not on file     Transportation needs:     Medical: Not on file     Non-medical: Not on file   Tobacco Use     Smoking status: Never Smoker     Smokeless tobacco: Never Used   Substance and Sexual Activity     Alcohol use: Yes      Comment: maybe a drink on weekend     Drug use: No     Sexual activity: Yes     Partners: Female     Birth control/protection: None   Lifestyle     Physical activity:     Days per week: Not on file     Minutes per session: Not on file     Stress: Not on file   Relationships     Social connections:     Talks on phone: Not on file     Gets together: Not on file     Attends Religion service: Not on file     Active member of club or organization: Not on file     Attends meetings of clubs or organizations: Not on file     Relationship status: Not on file     Intimate partner violence:     Fear of current or ex partner: Not on file     Emotionally abused: Not on file     Physically abused: Not on file     Forced sexual activity: Not on file   Other Topics Concern     Parent/sibling w/ CABG, MI or angioplasty before 65F 55M? Not Asked   Social History Narrative    Works in finance    Exercise 3-4 days per week     Running 30 minutes and lifting           Review of System:     Constitutional: Negative for fever or chills  Skin: Negative for rashes  Ears/Nose/Throat: positive for nasal congestion, sore throat  Respiratory: positive for cough  Cardiovascular: Negative for chest pain  Gastrointestinal: Negative for nausea, vomiting  Genitourinary: Negative for dysuria, hematuria  Musculoskeletal: Negative for myalgias  Neurologic: Negative for headaches  Psychiatric: Negative for depression, anxiety  Hematologic/Lymphatic/Immunologic: Negative  Endocrine: Negative  Behavioral: Negative for tobacco use       Physical Exam:   /79 (BP Location: Left arm, Patient Position: Sitting, Cuff Size: Adult Regular)   Pulse 88   Temp 98.3  F (36.8  C) (Oral)   Ht 1.829 m (6')   Wt 77.6 kg (171 lb)   SpO2 98%   BMI 23.19 kg/m      GENERAL: alert and no distress  EYES: eyes grossly normal to inspection, and conjunctivae and sclerae normal  HENT: Normocephalic atraumatic. Nose and mouth without ulcers or lesions  NECK:  supple  RESP: intermittent dry coughing spells present   CV: regular rate and rhythm, normal S1 S2  LYMPH: no peripheral edema   ABDOMEN: nondistended  MS: no gross musculoskeletal defects noted  SKIN: no suspicious lesions or rashes  NEURO: Alert & Oriented x 3.   PSYCH: mentation appears normal, affect normal        Diagnostic Test Results:     Diagnostic Test Results:  Results for orders placed or performed during the hospital encounter of 12/07/18   COLONOSCOPY   Result Value Ref Range    COLONOSCOPY       [facility]  _______________________________________________________________________________  Patient Name: Thomas Yip           Procedure Date: 12/7/2018 7:53 AM  MRN: [id number]                       Account Number: [id number]  YOB: 1970             Admit Type: Outpatient  Age: 47                               Room: 1  Note Status: Finalized                Attending MD: Baldemar Salazar MD  Total Sedation Time: 20 minutes       Instrument Name: 321 [id number]   AdultColonoscope  _______________________________________________________________________________     Procedure:                Colonoscopy  Indications:              Screening for colorectal malignant neoplasm  Providers:                Baldemar Salazar MD, Misty Belle, RYAN  Referring MD:             Haile Solomon MD  Medicines:                Midazolam 2 mg IV, Fentanyl 100 micrograms IV  Complications:            No immediate complications.  _________ ______________________________________________________________________  Procedure:                Pre-Anesthesia Assessment:                            - Prior to the procedure, a History and Physical                             was performed, and patient medications and                             allergies were reviewed. The patient is competent.                             The risks and benefits of the procedure and the                              sedation options and risks were discussed with the                             patient. All questions were answered and informed                             consent was obtained. Patient identification and                             proposed procedure were verified by the physician                             in the pre-procedure area. Mental Status                             Examination: alert and oriented. Airway                             Examination: normal oropharyngeal airway and neck                             mobility. Respirat ory Examination: clear to                             auscultation. CV Examination: normal. Prophylactic                             Antibiotics: The patient does not require                             prophylactic antibiotics. Prior Anticoagulants: The                             patient has taken no previous anticoagulant or                             antiplatelet agents. ASA Grade Assessment: II - A                             patient with mild systemic disease. After reviewing                             the risks and benefits, the patient was deemed in                             satisfactory condition to undergo the procedure.                             The anesthesia plan was to use moderate sedation /                             analgesia (conscious sedation). Immediately prior                             to administration of medications, the patient was                             re-assessed for adequacy to receive sedatives. The                             heart rate, res piratory rate, oxygen saturations,                             blood pressure, adequacy of pulmonary ventilation,                             and response to care were monitored throughout the                             procedure. The physical status of the patient was                             re-assessed after the procedure.                            After obtaining  informed consent, the colonoscope                             was passed under direct vision. Throughout the                             procedure, the patient's blood pressure, pulse, and                             oxygen saturations were monitored continuously. The                             Colonoscope was introduced through the anus and                             advanced to the cecum, identified by the                             appendiceal orifice, ileocecal valve and palpation.                             The colonoscopy was performed without difficulty.                             The patient tolerated the  procedure well. The                             quality of the bowel preparation was good.                                                                                   Findings:       A 4 mm polyp was found in the cecum. The polyp was sessile. The polyp        was removed with a cold biopsy forceps. Resection and retrieval were        complete. Verification of patient identification for the specimen was        done. Estimated blood loss was minimal.       The exam was otherwise without abnormality on direct and retroflexion        views.                                                                                   Impression:               - One 4 mm polyp in the cecum, removed with a cold                             biopsy forceps. Resected and retrieved.                            - The examination was otherwise normal on direct                             and retroflexion views.  Recommendation:           - Discharge patient to home.                            - Await path ology results.                            - Repeat colonoscopy in 5-10 years for surveillance                             based on pathology results.                                                                                   Procedure Code(s):       --- Professional ---       55278, Colonoscopy, flexible; with  "biopsy, single or multiple  Diagnosis Code(s):       --- Professional ---       Z12.11, Encounter for screening for malignant neoplasm of colon       D12.0, Benign neoplasm of cecum    CPT copyright 2017 American Medical Association. All rights reserved.    The codes documented in this report are preliminary and upon  review may   be revised to meet current compliance requirements.    _____________________  Baldemar Salazar MD  12/7/2018 8:54:24 AM  I was physically present for the entire viewing portion of the exam.  Baldemar Salazar MD  Number of Addenda: 0    Note Initiated On: 12/7/2018 7:53 AM  MRN:                      [id number]  Procedure Date:           12/ 7/2018 7:53:36 AM  Scope Withdrawal Time: 0 hours 9 minutes 10 seconds   Total Procedure Duration: 0 hours 14 minutes 17 seconds   Estimated Blood Loss:       Scope In: 8:33:39 AM  Scope Out: 8:47:56 AM     Surgical pathology exam   Result Value Ref Range    Copath Report       Patient Name: RELL RATLIFF  MR#: [id number]  Specimen #: [id number]  Collected: 12/7/2018  Received: 12/7/2018  Reported: 12/12/2018 11:32  Ordering Phy(s): BALDEMAR SALAZAR    For improved result formatting, select 'View Enhanced Report Format' under   Linked Documents section.    SPECIMEN(S):  Cecal polyp    FINAL DIAGNOSIS:  Colon, cecum, polypectomy  - Tubular adenoma, no evidence of high-grade dysplasia or invasive   malignancy    Electronically signed out by:    Jl Montoya M.D.    GROSS:  A single specimen container with formalin is received labeled with the   patient's name, date of birth, and  medical record number. Information on the requisition slip, container, and   associated labels is confirmed.    The specimen is designated \"large intestine, cecum\". The specimen consists   of a single pale tan soft tissue  fragment measuring 0.4 cm in greatest dimension. The specimen is entirely   submitted in one cassette. (Dictated  by: Indy Moore 12/7/2018 " 12:10 PM)    RAEANN ROSCOPIC:  Microscopic performed    CPT Codes:  A: 77934-ZR2    TESTING LAB LOCATION:  [address]  [id number] [phone]    COLLECTION SITE:  Client: [facility]  Location: [facility]         ASSESSMENT/PLAN:   (J01.90) Acute sinusitis, recurrence not specified, unspecified location  (R05) Cough  (J06.9) Upper respiratory tract infection, unspecified type  (primary encounter diagnosis)  Comment: acute sinusitis and URI with cough symptoms  Plan: guaiFENesin-codeine (ROBITUSSIN AC) 100-10         MG/5ML solution, methylPREDNISolone (MEDROL         DOSEPAK) 4 MG tablet therapy pack, azithromycin        (ZITHROMAX) 250 MG tablet      Follow Up Plan:     Patient is instructed to return to Internal Medicine clinic for follow-up visit in 1 week.        Ashanti Whalen MD  Internal Medicine  [facility]

## 2019-04-15 ENCOUNTER — TELEPHONE (OUTPATIENT)
Dept: FAMILY MEDICINE | Facility: CLINIC | Age: 49
End: 2019-04-15

## 2019-04-15 NOTE — TELEPHONE ENCOUNTER
Reason for Call:  Other call back    Detailed comments: Pt is requesting a call back in regards to his URI. He states that he has completed one of his antibiotics and that there has been no improvement.    He was wanting confirmation that it is okay to start his second medication.    Phone Number Patient can be reached at: Cell number on file:    Telephone Information:   Mobile [phone]       Best Time: Any time, but asap.    Can we leave a detailed message on this number? YES    Call taken on 4/15/2019 at 9:13 AM by Thai Diallo

## 2019-04-15 NOTE — TELEPHONE ENCOUNTER
Z-pack will continue to work for several days after last dose, I would not start the Augmentin, if he feels that the symptoms have not responded to the Z-pack he should schedule an appointment with me or team provider to evaluate further

## 2019-04-15 NOTE — TELEPHONE ENCOUNTER
"To PCP: Pt has taken last dose of Zpack TODAY (rx'd by Dr. Whalen on 4/11 for 2week URI sx). Pt does not feel it \"worked\" see below. Sounds viral to me. He has Augmentin from [facility] that he never took. He is wondering if he should start? OR watch and wait, since just took last dose of azithromycin today. Fluids, rest, etc. Call back with worsening symptoms?     Please advise,   Thank you!  Puja NIETO RN      Spoke with patient:     Pt took ZPack, today is the last day.     Pt still does not feel that it has done anything     Pt was RX'd Augmentin at [facility] a few days prior to Zpack.     Is he OK to take Augmentin that he has on-hand.     Pt did not fill the Medrol Dosepak    states that his cough is not accessive- it is \"more in his head\" - congestion    Cough- at first non-productive, now brining up clear mucus.     Denies fever  Denies shortness of breath   Denies wheezing    R: Can try PLAIN Mucinex, increase fluids, rest, saline nasal spray. This may be viral and need to run it's course. Zpack will still be working since last dose was only today.         "

## 2019-04-15 NOTE — TELEPHONE ENCOUNTER
Relayed message to patient. If not feeling better by mid/end of week call back. Sooner if worse.     Puja NIETO RN

## 2019-09-27 ENCOUNTER — OFFICE VISIT (OUTPATIENT)
Dept: FAMILY MEDICINE | Facility: CLINIC | Age: 49
End: 2019-09-27
Payer: COMMERCIAL

## 2019-09-27 VITALS
WEIGHT: 169.2 LBS | HEART RATE: 57 BPM | HEIGHT: 72 IN | BODY MASS INDEX: 22.92 KG/M2 | TEMPERATURE: 97.7 F | OXYGEN SATURATION: 100 % | DIASTOLIC BLOOD PRESSURE: 78 MMHG | SYSTOLIC BLOOD PRESSURE: 119 MMHG

## 2019-09-27 DIAGNOSIS — R73.01 IMPAIRED FASTING GLUCOSE: ICD-10-CM

## 2019-09-27 DIAGNOSIS — G43.009 MIGRAINE WITHOUT AURA AND WITHOUT STATUS MIGRAINOSUS, NOT INTRACTABLE: ICD-10-CM

## 2019-09-27 DIAGNOSIS — Z12.5 PROSTATE CANCER SCREENING: ICD-10-CM

## 2019-09-27 DIAGNOSIS — Z00.00 ROUTINE GENERAL MEDICAL EXAMINATION AT A HEALTH CARE FACILITY: Primary | ICD-10-CM

## 2019-09-27 LAB
ALBUMIN SERPL-MCNC: 4.5 G/DL (ref 3.4–5)
ALP SERPL-CCNC: 65 U/L (ref 40–150)
ALT SERPL W P-5'-P-CCNC: 35 U/L (ref 0–70)
ANION GAP SERPL CALCULATED.3IONS-SCNC: 5 MMOL/L (ref 3–14)
AST SERPL W P-5'-P-CCNC: 18 U/L (ref 0–45)
BILIRUB SERPL-MCNC: 0.9 MG/DL (ref 0.2–1.3)
BUN SERPL-MCNC: 16 MG/DL (ref 7–30)
CALCIUM SERPL-MCNC: 9.2 MG/DL (ref 8.5–10.1)
CHLORIDE SERPL-SCNC: 104 MMOL/L (ref 94–109)
CHOLEST SERPL-MCNC: 189 MG/DL
CO2 SERPL-SCNC: 30 MMOL/L (ref 20–32)
CREAT SERPL-MCNC: 0.98 MG/DL (ref 0.66–1.25)
ERYTHROCYTE [DISTWIDTH] IN BLOOD BY AUTOMATED COUNT: 12.6 % (ref 10–15)
GFR SERPL CREATININE-BSD FRML MDRD: 89 ML/MIN/{1.73_M2}
GLUCOSE SERPL-MCNC: 102 MG/DL (ref 70–99)
HBA1C MFR BLD: 5.4 % (ref 0–5.6)
HCT VFR BLD AUTO: 44.8 % (ref 40–53)
HDLC SERPL-MCNC: 58 MG/DL
HGB BLD-MCNC: 15.6 G/DL (ref 13.3–17.7)
LDLC SERPL CALC-MCNC: 118 MG/DL
MCH RBC QN AUTO: 32.2 PG (ref 26.5–33)
MCHC RBC AUTO-ENTMCNC: 34.8 G/DL (ref 31.5–36.5)
MCV RBC AUTO: 93 FL (ref 78–100)
NONHDLC SERPL-MCNC: 131 MG/DL
PLATELET # BLD AUTO: 200 10E9/L (ref 150–450)
POTASSIUM SERPL-SCNC: 4.4 MMOL/L (ref 3.4–5.3)
PROT SERPL-MCNC: 7.4 G/DL (ref 6.8–8.8)
PSA SERPL-ACNC: 0.51 UG/L (ref 0–4)
RBC # BLD AUTO: 4.84 10E12/L (ref 4.4–5.9)
SODIUM SERPL-SCNC: 139 MMOL/L (ref 133–144)
TRIGL SERPL-MCNC: 63 MG/DL
WBC # BLD AUTO: 5.2 10E9/L (ref 4–11)

## 2019-09-27 PROCEDURE — 83036 HEMOGLOBIN GLYCOSYLATED A1C: CPT | Performed by: INTERNAL MEDICINE

## 2019-09-27 PROCEDURE — 80053 COMPREHEN METABOLIC PANEL: CPT | Performed by: INTERNAL MEDICINE

## 2019-09-27 PROCEDURE — G0103 PSA SCREENING: HCPCS | Performed by: INTERNAL MEDICINE

## 2019-09-27 PROCEDURE — 85027 COMPLETE CBC AUTOMATED: CPT | Performed by: INTERNAL MEDICINE

## 2019-09-27 PROCEDURE — 80061 LIPID PANEL: CPT | Performed by: INTERNAL MEDICINE

## 2019-09-27 PROCEDURE — 36415 COLL VENOUS BLD VENIPUNCTURE: CPT | Performed by: INTERNAL MEDICINE

## 2019-09-27 PROCEDURE — 99396 PREV VISIT EST AGE 40-64: CPT | Performed by: INTERNAL MEDICINE

## 2019-09-27 RX ORDER — ZOLMITRIPTAN 2.5 MG/1
2.5 TABLET, FILM COATED ORAL
Qty: 30 TABLET | Refills: 11 | Status: SHIPPED | OUTPATIENT
Start: 2019-09-27 | End: 2019-12-03

## 2019-09-27 ASSESSMENT — MIFFLIN-ST. JEOR: SCORE: 1665.49

## 2019-09-27 NOTE — PROGRESS NOTES
SUBJECTIVE:   CC: Thomas Yip is an 48 year old male who presents for preventative health visit.     Healthy Habits:     Getting at least 3 servings of Calcium per day:  Yes    Bi-annual eye exam:  Yes    Dental care twice a year:  Yes    Sleep apnea or symptoms of sleep apnea:  None    Diet:  Regular (no restrictions)    Frequency of exercise:  4-5 days/week    Duration of exercise:  30-45 minutes    Taking medications regularly:  Yes    Medication side effects:  None    PHQ-2 Total Score: 0    Additional concerns today:  No    Needs refill on zomig  Uses more than twice monthly  Migraines are worsening over the last year   Often wakes with headache wonders what cause might be  No change in diet caffeine or alcohol intake       Today's PHQ-2 Score:   PHQ-2 ( 1999 [employer]) 9/27/2019   Q1: Little interest or pleasure in doing things 0   Q2: Feeling down, depressed or hopeless 0   PHQ-2 Score 0   Q1: Little interest or pleasure in doing things -   Q2: Feeling down, depressed or hopeless -   PHQ-2 Score -       Abuse: Current or Past(Physical, Sexual or Emotional)- No  Do you feel safe in your environment? Yes    Social History     Tobacco Use     Smoking status: Never Smoker     Smokeless tobacco: Never Used   Substance Use Topics     Alcohol use: Yes     Comment: maybe a drink on weekend     If you drink alcohol do you typically have >3 drinks per day or >7 drinks per week? No    Alcohol Use 9/27/2019   Prescreen: >3 drinks/day or >7 drinks/week? -   Prescreen: >3 drinks/day or >7 drinks/week? No       Last PSA:   PSA   Date Value Ref Range Status   09/24/2018 0.46 0 - 4 ug/L Final     Comment:     Assay Method:  Chemiluminescence using Siemens Vista analyzer       Reviewed orders with patient. Reviewed health maintenance and updated orders accordingly - Yes  Patient Active Problem List   Diagnosis     Myalgia and myositis     Migraine without aura and without status migrainosus, not intractable     Past Surgical  History:   Procedure Laterality Date     COLONOSCOPY N/A 12/7/2018    Procedure: COMBINED COLONOSCOPY, SINGLE OR MULTIPLE BIOPSY/POLYPECTOMY BY BIOPSY;  Surgeon: Baldemar Salazar MD;  Location:  GI     ORTHOPEDIC SURGERY Right     ACL/MCL/Meniscus       Social History     Tobacco Use     Smoking status: Never Smoker     Smokeless tobacco: Never Used   Substance Use Topics     Alcohol use: Yes     Comment: maybe a drink on weekend     Family History   Problem Relation Age of Onset     Breast Cancer Mother      Arthritis Mother      Hypertension Father          Current Outpatient Medications   Medication Sig Dispense Refill     ZOLMitriptan (ZOMIG) 5 MG tablet Take 1 tablet (5 mg) by mouth at onset of headache for migraine (Patient taking differently: Take 2.5 mg by mouth at onset of headache for migraine ) 30 tablet 11     Allergies   Allergen Reactions     No Known Allergies        Reviewed and updated as needed this visit by clinical staff  Tobacco  Allergies  Meds  Soc Hx        Reviewed and updated as needed this visit by Provider        Past Medical History:   Diagnosis Date     Migraine      Myalgia and myositis 9/24/2014      Past Surgical History:   Procedure Laterality Date     COLONOSCOPY N/A 12/7/2018    Procedure: COMBINED COLONOSCOPY, SINGLE OR MULTIPLE BIOPSY/POLYPECTOMY BY BIOPSY;  Surgeon: Baldemar Salazar MD;  Location:  GI     ORTHOPEDIC SURGERY Right     ACL/MCL/Meniscus       Review of Systems  A 10 organ systems ROS is negative.     OBJECTIVE:   /78 (BP Location: Right arm, Cuff Size: Adult Large)   Pulse 57   Temp 97.7  F (36.5  C) (Oral)   Ht 1.829 m (6')   Wt 76.7 kg (169 lb 3.2 oz)   SpO2 100%   BMI 22.95 kg/m      Physical Exam  GENERAL: healthy, alert and no distress  EYES: Eyes grossly normal to inspection, PERRL and conjunctivae and sclerae normal  HENT: ear canals and TM's normal, nose and mouth without ulcers or lesions  NECK: no adenopathy, no asymmetry,  masses, or scars and thyroid normal to palpation  RESP: lungs clear to auscultation - no rales, rhonchi or wheezes  CV: regular rate and rhythm, normal S1 S2, no S3 or S4, no murmur, click or rub, no peripheral edema and peripheral pulses strong  ABDOMEN: soft, nontender, no hepatosplenomegaly, no masses and bowel sounds normal  RECTAL: he declined that exam this AM  MS: no gross musculoskeletal defects noted, no edema  SKIN: no suspicious lesions or rashes  NEURO: Normal strength and tone, mentation intact and speech normal  PSYCH: mentation appears normal, affect normal/bright    Diagnostic Test Results:  Labs pending     ASSESSMENT/PLAN:   1. Routine general medical examination at a health care facility    - Lipid panel reflex to direct LDL Fasting; Future  - Comprehensive metabolic panel; Future  - CBC with platelets; Future    2. Migraine without aura and without status migrainosus, not intractable  Worsening symptoms   Discussed prophylactic therapy  HE will consider  Also discussed referral to headache specialty clinic  He will consider   - ZOLMitriptan (ZOMIG) 2.5 MG tablet; Take 1 tablet (2.5 mg) by mouth at onset of headache for migraine  Dispense: 30 tablet; Refill: 11  - NEUROLOGY ADULT REFERRAL  - NEUROLOGY ADULT REFERRAL    3. Impaired fasting glucose    - Hemoglobin A1c; Future    4. Prostate cancer screening    - Prostate spec antigen screen; Future    COUNSELING:   Reviewed preventive health counseling, as reflected in patient instructions  Special attention given to:        Regular exercise       Healthy diet/nutrition       Immunizations    He will get flu shot at local pharmacy             HIV screeninx in teen years, 1x in adult years, and at intervals if high risk; will do today       Colon cancer screening; repeat in        Prostate cancer screening; PSA    Estimated body mass index is 22.95 kg/m  as calculated from the following:    Height as of this encounter: 1.829 m (6').    Weight  as of this encounter: 76.7 kg (169 lb 3.2 oz).          reports that he has never smoked. He has never used smokeless tobacco.      Counseling Resources:  ATP IV Guidelines  Pooled Cohorts Equation Calculator  FRAX Risk Assessment  ICSI Preventive Guidelines  Dietary Guidelines for Americans, 2010  USDA's MyPlate  ASA Prophylaxis  Lung CA Screening    Haile Solomon MD  [facility]

## 2019-09-29 NOTE — RESULT ENCOUNTER NOTE
"The following letter pertains to your most recent diagnostic tests:    -Your prostate specific antigen (PSA) test result returned normal.     -Your total cholesterol is 189 which is at your goal of total cholesterol less than 200.    -Your triglycerides are 63 which are at your goal of triglycerides less than 150.    -Your HDL or \"good cholesterol\" is 58 which is at your goal of HDL cholesterol greater than 40.    -Your LDL cholesterol or \"bad cholesterol\" is 118 which is at your goal of LDL cholesterol less than <160.  Your LDL goal is based on your risk factors for artery disease.     -Liver and gallbladder tests are normal for you. (ALT,AST, Alk phos, bilirubin), kidney function is normal for you (Creatinine, GFR), Sodium is normal, Potassium is normal for you, Calcium is normal for you, Glucose (blood sugar) is mildly elevated, but improved from previous exams and not in the diabetic range.      -Your hemoglobin A1c test which is a diabetes blood test that represents and average of your blood sugars over the last 3 months returned at 5.4 which is normal.      -Your complete blood counts including your hemoglobin returned normal for you.           Bottom line:  Your lab results look stable, healthy and at goal.        Follow up:  Schedule an appointment for a physical examination with fasting blood tests in one year's time, or return sooner if new questions, symptoms or problems arise.       Sincerely,    Dr. Solomon    The 10-year ASCVD risk score (Karena PRESLEY Jr., et al., 2013) is: 2.4%    Values used to calculate the score:      Age: 48 years      Sex: Male      Is Non- : No      Diabetic: No      Tobacco smoker: No      Systolic Blood Pressure: 119 mmHg      Is BP treated: No      HDL Cholesterol: 58 mg/dL      Total Cholesterol: 189 mg/dL"

## 2019-11-25 ENCOUNTER — PRE VISIT (OUTPATIENT)
Dept: NEUROLOGY | Facility: CLINIC | Age: 49
End: 2019-11-25

## 2019-11-25 NOTE — TELEPHONE ENCOUNTER
FUTURE VISIT INFORMATION      FUTURE VISIT INFORMATION:    Date: 1/6/2020    Time: 8AM    Location: [facility]  REFERRAL INFORMATION:    Referring provider:  Dr. Solomon     Referring providers clinic:  [facility]     Reason for visit/diagnosis  Migraines     RECORDS REQUESTED FROM:       Clinic name Comments Records Status Imaging Status    * No records in Care Everywhere*

## 2019-12-02 ENCOUNTER — TELEPHONE (OUTPATIENT)
Dept: SCHEDULING | Facility: CLINIC | Age: 49
End: 2019-12-02

## 2019-12-02 DIAGNOSIS — G43.009 MIGRAINE WITHOUT AURA AND WITHOUT STATUS MIGRAINOSUS, NOT INTRACTABLE: ICD-10-CM

## 2019-12-02 NOTE — TELEPHONE ENCOUNTER
Reason for call:  Medication   If this is a refill request, has the caller requested the refill from the pharmacy already? N/A  Will the patient be using a [facility]? No  Name of the pharmacy and phone number for the current request: [facility]    Name of the medication requested: ZOMIG 5MG    Other request: PT HAD PRESCRIPTION LOWERED BUT IT COSTS MORE MONEY. HE WOULD LIKE TO RECEIVE THE 5 MG AGAIN     Phone number to reach patient:  Home number on file [phone] (home)    Best Time:  ANYTIME    Can we leave a detailed message on this number?  YES

## 2019-12-02 NOTE — TELEPHONE ENCOUNTER
Patient is requesting to go back to Zomig 5 mg again due to cost issues with the 2.5 mg dose costing more.    Fabian Oates, YOLI on 12/2/2019 at 2:14 PM

## 2019-12-03 RX ORDER — ZOLMITRIPTAN 5 MG/1
5 TABLET, FILM COATED ORAL
Qty: 30 TABLET | Refills: 11 | Status: SHIPPED | OUTPATIENT
Start: 2019-12-03 | End: 2020-12-17

## 2019-12-16 ENCOUNTER — OFFICE VISIT (OUTPATIENT)
Dept: FAMILY MEDICINE | Facility: CLINIC | Age: 49
End: 2019-12-16
Payer: COMMERCIAL

## 2019-12-16 VITALS
DIASTOLIC BLOOD PRESSURE: 68 MMHG | SYSTOLIC BLOOD PRESSURE: 118 MMHG | WEIGHT: 169 LBS | BODY MASS INDEX: 22.89 KG/M2 | TEMPERATURE: 97.7 F | OXYGEN SATURATION: 100 % | HEIGHT: 72 IN | HEART RATE: 69 BPM

## 2019-12-16 DIAGNOSIS — N50.89 SCROTAL MASS: Primary | ICD-10-CM

## 2019-12-16 PROCEDURE — 99213 OFFICE O/P EST LOW 20 MIN: CPT | Performed by: INTERNAL MEDICINE

## 2019-12-16 ASSESSMENT — MIFFLIN-ST. JEOR: SCORE: 1664.58

## 2019-12-16 NOTE — PROGRESS NOTES
Subjective     Thomas Yip is a 48 year old male who presents to clinic today for the following health issues:    HPI   Small growth on right testicle.  Noticed it on Saturday 12/14/2019.  No associated pain.        Patient Active Problem List   Diagnosis     Myalgia and myositis     Migraine without aura and without status migrainosus, not intractable     Past Surgical History:   Procedure Laterality Date     COLONOSCOPY N/A 12/7/2018    Procedure: COMBINED COLONOSCOPY, SINGLE OR MULTIPLE BIOPSY/POLYPECTOMY BY BIOPSY;  Surgeon: Baldemar Salazar MD;  Location:  GI     ORTHOPEDIC SURGERY Right     ACL/MCL/Meniscus       Social History     Tobacco Use     Smoking status: Never Smoker     Smokeless tobacco: Never Used   Substance Use Topics     Alcohol use: Yes     Comment: maybe a drink on weekend     Family History   Problem Relation Age of Onset     Breast Cancer Mother      Arthritis Mother      Hypertension Father          Current Outpatient Medications   Medication Sig Dispense Refill     ZOLMitriptan (ZOMIG) 5 MG tablet Take 1 tablet (5 mg) by mouth at onset of headache for migraine 30 tablet 11     Allergies   Allergen Reactions     No Known Allergies        Reviewed and updated as needed this visit by Provider         Review of Systems   ROS COMP: no fevers, or chills or weight loss       Objective    /68 (BP Location: Right arm, Patient Position: Sitting, Cuff Size: Adult Large)   Pulse 69   Temp 97.7  F (36.5  C) (Oral)   Ht 1.829 m (6')   Wt 76.7 kg (169 lb)   SpO2 100%   BMI 22.92 kg/m    Body mass index is 22.92 kg/m .  Physical Exam   GEN:   Well appearing  :  I don't feel any obvious abnormal scrotal masses, I think what he is feeling is his right epidymidis, there is similar soft mass on the top of the contralateral testicle       Scrotal ultrasound pending         Assessment & Plan       ICD-10-CM    1. Scrotal mass N50.89 US Testicular and Scrotum     Patient feels a mass  overlying right testicle.  I think he is feeling his epidymidis which was reassuring to him, but we agreed that an ultrasound would rule out any dangerous pathology.  He will schedule an ultrasound.           Return in about 9 months (around 9/25/2020) for Preventive Visit .  Or sooner pending ultrasound findings     Haile Solomon MD  [facility]

## 2019-12-17 ENCOUNTER — HOSPITAL ENCOUNTER (OUTPATIENT)
Dept: ULTRASOUND IMAGING | Facility: CLINIC | Age: 49
Discharge: HOME | End: 2019-12-17
Attending: INTERNAL MEDICINE | Admitting: INTERNAL MEDICINE
Payer: COMMERCIAL

## 2019-12-17 DIAGNOSIS — N50.89 SCROTAL MASS: ICD-10-CM

## 2019-12-17 PROCEDURE — 93976 VASCULAR STUDY: CPT

## 2019-12-17 NOTE — RESULT ENCOUNTER NOTE
The following letter pertains to your most recent diagnostic tests:    Good news!  There was no mass demonstrated on your scrotum ultrasound.  Nothing to worry about.       Happy Holidays!      Sincerely,    Dr. Solomon

## 2020-06-05 ENCOUNTER — OFFICE VISIT (OUTPATIENT)
Dept: INTERNAL MEDICINE | Facility: CLINIC | Age: 50
End: 2020-06-05
Payer: COMMERCIAL

## 2020-06-05 VITALS
OXYGEN SATURATION: 99 % | BODY MASS INDEX: 22.65 KG/M2 | DIASTOLIC BLOOD PRESSURE: 72 MMHG | WEIGHT: 167 LBS | RESPIRATION RATE: 16 BRPM | SYSTOLIC BLOOD PRESSURE: 130 MMHG | TEMPERATURE: 97.2 F | HEART RATE: 67 BPM

## 2020-06-05 DIAGNOSIS — Z01.818 PREOP GENERAL PHYSICAL EXAM: Primary | ICD-10-CM

## 2020-06-05 DIAGNOSIS — G43.009 MIGRAINE WITHOUT AURA AND WITHOUT STATUS MIGRAINOSUS, NOT INTRACTABLE: ICD-10-CM

## 2020-06-05 DIAGNOSIS — S83.206A TEAR OF RIGHT MENISCUS AS CURRENT INJURY, INITIAL ENCOUNTER: ICD-10-CM

## 2020-06-05 PROCEDURE — 99214 OFFICE O/P EST MOD 30 MIN: CPT | Performed by: PHYSICIAN ASSISTANT

## 2020-06-05 NOTE — PROGRESS NOTES
[address] [id number] [phone]  Dept: [phone]    PRE-OP EVALUATION:  Today's date: 2020    Thomas Yip (: 1970) presents for pre-operative evaluation assessment as requested by Dr. Mota.  He requires evaluation and anesthesia risk assessment prior to undergoing surgery/procedure for treatment of Meniscus tear of right knee .    Fax number for surgical facility: [phone]  Primary Physician: Haile Solomon  Type of Anesthesia Anticipated: to be determined    Patient has a Health Care Directive or Living Will:  YES     Preop Questions 2020   Who is doing your surgery? Akin Mota   What are you having done? Knee surgery   Date of Surgery/Procedure: 2020   Facility or Hospital where procedure/surgery will be performed: [facility]   1.  Do you have a history of Heart attack, stroke, stent, coronary bypass surgery, or other heart surgery? No   2.  Do you ever have any pain or discomfort in your chest? No   3.  Do you have a history of  Heart Failure? No   4.   Are you troubled by shortness of breath when:  walking on a level surface, or up a slight hill, or at night? No   5.  Do you currently have a cold, bronchitis or other respiratory infection? No   6.  Do you have a cough, shortness of breath, or wheezing? No   7.  Do you sometimes get pains in the calves of your legs when you walk? No   8. Do you or anyone in your family have previous history of blood clots? No   9.  Do you or does anyone in your family have a serious bleeding problem such as prolonged bleeding following surgeries or cuts? No   10. Have you ever had problems with anemia or been told to take iron pills? No   11. Have you had any abnormal blood loss such as black, tarry or bloody stools? No   12. Have you ever had a blood transfusion? No   13. Have you or any of your relatives ever had problems with anesthesia? No   14. Do you have sleep apnea, excessive  snoring or daytime drowsiness? No   15. Do you have any prosthetic heart valves? No   16. Do you have prosthetic joints? No         HPI:     HPI related to upcoming procedure: right knee meniscal tear       See problem list for active medical problems.  Problems all longstanding and stable, except as noted/documented.  See ROS for pertinent symptoms related to these conditions.      MEDICAL HISTORY:     Patient Active Problem List    Diagnosis Date Noted     Migraine without aura and without status migrainosus, not intractable 09/16/2016     Priority: Medium     Myalgia and myositis 09/24/2014     Priority: Medium     Class: Chronic      Past Medical History:   Diagnosis Date     Migraine      Myalgia and myositis 9/24/2014     Past Surgical History:   Procedure Laterality Date     COLONOSCOPY N/A 12/7/2018    Procedure: COMBINED COLONOSCOPY, SINGLE OR MULTIPLE BIOPSY/POLYPECTOMY BY BIOPSY;  Surgeon: Baldemar Salazar MD;  Location:  GI     ORTHOPEDIC SURGERY Right     ACL/MCL/Meniscus     Current Outpatient Medications   Medication Sig Dispense Refill     ZOLMitriptan (ZOMIG) 5 MG tablet Take 1 tablet (5 mg) by mouth at onset of headache for migraine 30 tablet 11     OTC products: no recent use of OTC ASA, NSAIDS or Steroids    Allergies   Allergen Reactions     No Known Allergies       Latex Allergy: NO    Social History     Tobacco Use     Smoking status: Never Smoker     Smokeless tobacco: Never Used   Substance Use Topics     Alcohol use: Yes     Comment: maybe a drink on weekend     History   Drug Use No       REVIEW OF SYSTEMS:   CONSTITUTIONAL: NEGATIVE for fever, chills, change in weight  INTEGUMENTARY/SKIN: NEGATIVE for worrisome rashes, moles or lesions  ENT/MOUTH: NEGATIVE for ear, mouth and throat problems  RESP: NEGATIVE for significant cough or SOB  CV: NEGATIVE for chest pain, palpitations or peripheral edema  GI: NEGATIVE for nausea, abdominal pain, heartburn, or change in bowel  habits  MUSCULOSKELETAL: NEGATIVE for significant arthralgias or myalgia  NEURO: NEGATIVE for weakness, dizziness or paresthesias  ENDOCRINE: NEGATIVE for temperature intolerance, skin/hair changes  HEME/ALLERGY/IMMUNE: NEGATIVE for bleeding problems  PSYCHIATRIC: NEGATIVE for changes in mood or affect  ROS otherwise negative    EXAM:   /72 (BP Location: Left arm, Patient Position: Chair, Cuff Size: Adult Regular)   Pulse 67   Temp 97.2  F (36.2  C) (Temporal)   Resp 16   Wt 75.8 kg (167 lb)   SpO2 99%   BMI 22.65 kg/m    GENERAL APPEARANCE: healthy, alert and no distress  HENT: ear canals and TM's normal and nose and mouth without ulcers or lesions  RESP: lungs clear to auscultation - no rales, rhonchi or wheezes  CV: regular rate and rhythm, normal S1 S2, no S3 or S4 and no murmur, click or rub   ABDOMEN: soft, nontender, no HSM or masses and bowel sounds normal  SKIN: no suspicious lesions or rashes  NEURO: Normal strength and tone, sensory exam grossly normal, mentation intact and speech normal  PSYCH: mentation appears normal and affect normal/bright    DIAGNOSTICS:   EKG: Not indicated due to non-vascular surgery and low risk of event (age <65 and without cardiac risk factors)    Recent Labs   Lab Test 09/27/19  0941 09/24/18  0950   HGB 15.6 15.6    213    138   POTASSIUM 4.4 4.7   CR 0.98 1.03   A1C 5.4  --         IMPRESSION:   Reason for surgery/procedure: right meniscus tear  Diagnosis/reason for consult: Migraine    The proposed surgical procedure is considered INTERMEDIATE risk.    REVISED CARDIAC RISK INDEX  The patient has the following serious cardiovascular risks for perioperative complications such as (MI, PE, VFib and 3  AV Block):  No serious cardiac risks  INTERPRETATION: 0 risks: Class I (very low risk - 0.4% complication rate)    The patient has the following additional risks for perioperative complications:  No identified additional risks      ICD-10-CM    1. Preop  general physical exam  Z01.818    2. Tear of right meniscus as current injury, initial encounter  S83.206A    3. Migraine without aura and without status migrainosus, not intractable  G43.009        RECOMMENDATIONS:         --Patient is to take all scheduled medications on the day of surgery EXCEPT for modifications listed below.  Reviewed no ASA or NSAID before surgery     APPROVAL GIVEN to proceed with proposed procedure, without further diagnostic evaluation       Signed Electronically by: Shanell Loaiza PA-C    Copy of this evaluation report is provided to requesting physician.    [place] Preop Guidelines    Revised Cardiac Risk Index

## 2020-06-18 NOTE — TELEPHONE ENCOUNTER
RECORDS RECEIVED FROM: Internal   Date of Appt: 6/23/20   NOTES (FOR ALL VISITS) STATUS DETAILS   OFFICE NOTE from referring provider Internal Dr Haile Solomon @  [facility]:  9/27/19   OFFICE NOTE from other specialist Internal Dr Geovany Perez @ [facility]:  9/24/15   DISCHARGE SUMMARY from hospital N/A    DISCHARGE REPORT from the ER N/A    OPERATIVE REPORT N/A    MEDICATION LIST Internal    IMAGING  (FOR ALL VISITS)     EMG Received [facility]:  8/3/15   EEG N/A    MRI (HEAD, NECK, SPINE) N/A    LUMBAR PUNCTURE N/A    KAYCEE Scan N/A    CT (HEAD, NECK, SPINE) N/A

## 2020-06-22 ENCOUNTER — TRANSFERRED RECORDS (OUTPATIENT)
Dept: HEALTH INFORMATION MANAGEMENT | Facility: CLINIC | Age: 50
End: 2020-06-22

## 2020-06-23 ENCOUNTER — PRE VISIT (OUTPATIENT)
Dept: NEUROLOGY | Facility: CLINIC | Age: 50
End: 2020-06-23

## 2020-06-23 ENCOUNTER — VIRTUAL VISIT (OUTPATIENT)
Dept: NEUROLOGY | Facility: CLINIC | Age: 50
End: 2020-06-23
Attending: INTERNAL MEDICINE
Payer: COMMERCIAL

## 2020-06-23 DIAGNOSIS — G43.009 MIGRAINE WITHOUT AURA AND WITHOUT STATUS MIGRAINOSUS, NOT INTRACTABLE: Primary | ICD-10-CM

## 2020-06-23 NOTE — PROGRESS NOTES
"Thomas Yip is a 49 year old male who is being evaluated via a billable video visit.      The patient has been notified of following:     \"This video visit will be conducted via a call between you and your physician/provider. We have found that certain health care needs can be provided without the need for an in-person physical exam.  This service lets us provide the care you need with a video conversation.  If a prescription is necessary we can send it directly to your pharmacy.  If lab work is needed we can place an order for that and you can then stop by our lab to have the test done at a later time.    Video visits are billed at different rates depending on your insurance coverage.  Please reach out to your insurance provider with any questions.    If during the course of the call the physician/provider feels a video visit is not appropriate, you will not be charged for this service.\"    Patient has given verbal consent for Video visit? YES    Will anyone else be joining your video visit? no        Video-Visit Details    Type of service:  Video Visit    Video Start Time: 4:59pm  Video End Time: 5:35pm    Originating Location (pt. Location): Home    Distant Location (provider location):  [facility] NEUROLOGY     Platform used for Video Visit:Jose De Jesus Turk, EMT    Physician Attestation   ITelma MD, saw this patient and agree with the findings and plan of care as documented in the note.      He has episodic migraine, which is occurring more in the mornings now compared to his previous history. He will continue to monitor this and try side sleeping instead of stomach sleeping, pulse oximetry at night (prefers to complete this himself with his home device before considering a formal study), and update me if he develops worsening or any changes or would like to consider medication changes.    I spent 36 minutes with the patient, over half of which was counseling.     Telma Lo MD        "

## 2020-06-23 NOTE — LETTER
6/23/2020       RE: Thomas Yip  [address]  [place] [place] [facility] [address]     Dear Colleague,    Thank you for referring your patient, Thomas Yip, to the [facility] NEUROLOGY at [facility]. Please see a copy of my visit note below.    Thomas Yip is a 49 year old male who is being evaluated via a billable video visit.        Video-Visit Details    Type of service:  Video Visit    Video Start Time: 4:59pm  Video End Time: 5:35pm    Originating Location (pt. Location): Home    Distant Location (provider location):  [facility] NEUROLOGY     Platform used for Video Visit:Jose De Jesus Turk, EMT    Physician Attestation   I, Telma Lo MD, saw this patient and agree with the findings and plan of care as documented in the note.      He has episodic migraine, which is occurring more in the mornings now compared to his previous history. He will continue to monitor this and try side sleeping instead of stomach sleeping, pulse oximetry at night (prefers to complete this himself with his home device before considering a formal study), and update me if he develops worsening or any changes or would like to consider medication changes.    I spent 36 minutes with the patient, over half of which was counseling.     Telma Lo MD              [facility]    Thomas Yip MRN# [id number]   Age: 49 year old YOB: 1970     Requesting physician: Haile Simeon            Assessment and Plan:   Assessment:  Thomas Yip is a 49 year old who presents with headaches 1-2 times per week described as dull over the front which can become severe accompanied by nausea. I suspect these are episodic migraines. He does report symptoms out of sleep but denies any risk factors for sleep apnea. We discussed sleeping modifications like side sleeping with a thicker pillow or sleeping on his back. He denies neck pain but it could be that he has degenerative joint disease  in the neck or that he is putting strain on his neck when sleeping on his stomach. Lastly it could be that over time his headaches are changing with no identifiable associated factor and it can be normal for headaches to evolve over time. He does not have any red flag symptoms or any new focal numbness or weakness any where, no vision changes that would warrant brain imaging at this time.     We recommended trying a pulse ox over night and if he and his wife notice oxygen dropping at night a sleep study may be warranted.        Plan:    -try sleeping modifications first, side sleeping and different pillow thicknesses   -consider sleep study or neck imaging and physical therapy in the future   -offered anti nausea medication, patient is not interested in taking medications and feels symptoms are manageable  -offered preventative as patient is having a few severe headaches per month, but patient is not interested in a daily medication at this time, he will contact us if his headaches worsen or become more bothersome   -continue abortive therapy with zolmitriptan and Advil or Excedrin        Jessica Salamanca,   Neurology PGY-2   [phone]             History of Present Illness:     chief complaint:   Chief Complaint   Patient presents with     Video Visit     [facility] VIDEO VISIT - TAWANDA Yip is a 49 year old male presenting for assessment of headaches. He has had headaches off and on since he was a teenager but more recently they have started to happen upon waking in the morning. He states he wakes up with a 2/10 dull pain across his forehead which will worsen and become severe. He typically takes Advil or Excedrin and for significant headache, zolmitriptan. This typically relieves the headache sometimes completely but at worst to a mild headache that is tolerable. He gest headaches 1-2 times per week and gets severe headaches about 3 times per month. The severe headaches are accompanied by light  sensitivity and nausea, sometimes vomiting. He gets very fatigued.     His main concern is that the headaches are coming out of sleep now. He denies snoring, reports he typically does not feel tired throughout the day, and denies apnea events at night which his wife collaborates. He reports he typically sleeps well and gets 8 hours. He reports not head or neck trauma, denies neck pain. He does sleep in his stomach and has been working towards sleeping on his side or back. Sometimes he will feel off the night before a headache, like his mentation is not quite normal.     He drinks 1-2 cups of coffee a day, rarely drinks alcohol, and does not use tobacco products.     Current headache symptoms:  Frequency: 1-2x/week  Quality: dill  Location: forehead  Duration: 1 hour if he takes abortive   Associated symptoms: nausea/vomitting, light sensitivity, fatigue   Awakens from sleep due to sx's:  No  Precipitating Injury:  No      Previous Treatment Trials:             Physical Exam:     Vitals: There were no vitals taken for this visit.  General: Cooperative, NAD  Neck: Good range of motion in head rotation bilaterally, no pain  Neurologic:  Mental Status: Fully alert, attentive and oriented. Speech clear and fluent.   Cranial Nerves: EOMI, Facial movements symmetric.   Motor:   Moving all extremities antigravity, no pronator drift in upper ext.    Station/Gait: deferred, recent knee surgery               Data:   All laboratory data reviewed  All imaging studies reviewed by me             DATA for DOCUMENTATION:         Past Medical History:     Patient Active Problem List   Diagnosis     Myalgia and myositis     Migraine without aura and without status migrainosus, not intractable     Past Medical History:   Diagnosis Date     Migraine      Myalgia and myositis 9/24/2014       Also see scanned health assessment forms.       Past Surgical History:     Past Surgical History:   Procedure Laterality Date     COLONOSCOPY N/A  12/7/2018    Procedure: COMBINED COLONOSCOPY, SINGLE OR MULTIPLE BIOPSY/POLYPECTOMY BY BIOPSY;  Surgeon: Baldemar Salazar MD;  Location:  GI     ORTHOPEDIC SURGERY Right     ACL/MCL/Meniscus            Social History:     Social History     Socioeconomic History     Marital status:      Spouse name: Not on file     Number of children: Not on file     Years of education: Not on file     Highest education level: Not on file   Occupational History     Not on file   Social Needs     Financial resource strain: Not on file     Food insecurity     Worry: Not on file     Inability: Not on file     Transportation needs     Medical: Not on file     Non-medical: Not on file   Tobacco Use     Smoking status: Never Smoker     Smokeless tobacco: Never Used   Substance and Sexual Activity     Alcohol use: Yes     Comment: maybe a drink on weekend     Drug use: No     Sexual activity: Yes     Partners: Female     Birth control/protection: None   Lifestyle     Physical activity     Days per week: Not on file     Minutes per session: Not on file     Stress: Not on file   Relationships     Social connections     Talks on phone: Not on file     Gets together: Not on file     Attends [employer] service: Not on file     Active member of club or organization: Not on file     Attends meetings of clubs or organizations: Not on file     Relationship status: Not on file     Intimate partner violence     Fear of current or ex partner: Not on file     Emotionally abused: Not on file     Physically abused: Not on file     Forced sexual activity: Not on file   Other Topics Concern     Parent/sibling w/ CABG, MI or angioplasty before 65F 55M? Not Asked   Social History Narrative    Works in finance    Exercise 3-4 days per week     Running 30 minutes and lifting              Family History:     Family History   Problem Relation Age of Onset     Breast Cancer Mother      Arthritis Mother      Hypertension Father              Medications:     Current Outpatient Medications   Medication Sig     ZOLMitriptan (ZOMIG) 5 MG tablet Take 1 tablet (5 mg) by mouth at onset of headache for migraine     No current facility-administered medications for this visit.               Review of Systems:   A comprehensive 10 point review of systems (constitutional, ENT, cardiac, peripheral vascular, lymphatic, respiratory, GI, , Musculoskeletal, skin, Neurological) was performed and found to be negative except as described in this note.     See intake form completed by patient      Again, thank you for allowing me to participate in the care of your patient.      Sincerely,    Telma Lo MD

## 2020-06-23 NOTE — PROGRESS NOTES
[facility]    Thomas Yip MRN# [id number]   Age: 49 year old YOB: 1970     Requesting physician: Haile Simeon            Assessment and Plan:   Assessment:  Thomas Yip is a 49 year old who presents with headaches 1-2 times per week described as dull over the front which can become severe accompanied by nausea. I suspect these are episodic migraines. He does report symptoms out of sleep but denies any risk factors for sleep apnea. We discussed sleeping modifications like side sleeping with a thicker pillow or sleeping on his back. He denies neck pain but it could be that he has degenerative joint disease in the neck or that he is putting strain on his neck when sleeping on his stomach. Lastly it could be that over time his headaches are changing with no identifiable associated factor and it can be normal for headaches to evolve over time. He does not have any red flag symptoms or any new focal numbness or weakness any where, no vision changes that would warrant brain imaging at this time.     We recommended trying a pulse ox over night and if he and his wife notice oxygen dropping at night a sleep study may be warranted.        Plan:    -try sleeping modifications first, side sleeping and different pillow thicknesses   -consider sleep study or neck imaging and physical therapy in the future   -offered anti nausea medication, patient is not interested in taking medications and feels symptoms are manageable  -offered preventative as patient is having a few severe headaches per month, but patient is not interested in a daily medication at this time, he will contact us if his headaches worsen or become more bothersome   -continue abortive therapy with zolmitriptan and Advil or Excedrin        Jessica Salamanca DO  Neurology PGY-2   [phone]             History of Present Illness:     chief complaint:   Chief Complaint   Patient presents with     Video Visit     [facility] VIDEO  VISIT - TAWANDA Yip is a 49 year old male presenting for assessment of headaches. He has had headaches off and on since he was a teenager but more recently they have started to happen upon waking in the morning. He states he wakes up with a 2/10 dull pain across his forehead which will worsen and become severe. He typically takes Advil or Excedrin and for significant headache, zolmitriptan. This typically relieves the headache sometimes completely but at worst to a mild headache that is tolerable. He gest headaches 1-2 times per week and gets severe headaches about 3 times per month. The severe headaches are accompanied by light sensitivity and nausea, sometimes vomiting. He gets very fatigued.     His main concern is that the headaches are coming out of sleep now. He denies snoring, reports he typically does not feel tired throughout the day, and denies apnea events at night which his wife collaborates. He reports he typically sleeps well and gets 8 hours. He reports not head or neck trauma, denies neck pain. He does sleep in his stomach and has been working towards sleeping on his side or back. Sometimes he will feel off the night before a headache, like his mentation is not quite normal.     He drinks 1-2 cups of coffee a day, rarely drinks alcohol, and does not use tobacco products.     Current headache symptoms:  Frequency: 1-2x/week  Quality: dill  Location: forehead  Duration: 1 hour if he takes abortive   Associated symptoms: nausea/vomitting, light sensitivity, fatigue   Awakens from sleep due to sx's:  No  Precipitating Injury:  No      Previous Treatment Trials:             Physical Exam:     Vitals: There were no vitals taken for this visit.  General: Cooperative, NAD  Neck: Good range of motion in head rotation bilaterally, no pain  Neurologic:  Mental Status: Fully alert, attentive and oriented. Speech clear and fluent.   Cranial Nerves: EOMI, Facial movements symmetric.   Motor:   Moving  all extremities antigravity, no pronator drift in upper ext.    Station/Gait: deferred, recent knee surgery               Data:   All laboratory data reviewed  All imaging studies reviewed by me             DATA for DOCUMENTATION:         Past Medical History:     Patient Active Problem List   Diagnosis     Myalgia and myositis     Migraine without aura and without status migrainosus, not intractable     Past Medical History:   Diagnosis Date     Migraine      Myalgia and myositis 9/24/2014       Also see scanned health assessment forms.       Past Surgical History:     Past Surgical History:   Procedure Laterality Date     COLONOSCOPY N/A 12/7/2018    Procedure: COMBINED COLONOSCOPY, SINGLE OR MULTIPLE BIOPSY/POLYPECTOMY BY BIOPSY;  Surgeon: Baldemar Salazar MD;  Location:  GI     ORTHOPEDIC SURGERY Right     ACL/MCL/Meniscus            Social History:     Social History     Socioeconomic History     Marital status:      Spouse name: Not on file     Number of children: Not on file     Years of education: Not on file     Highest education level: Not on file   Occupational History     Not on file   Social Needs     Financial resource strain: Not on file     Food insecurity     Worry: Not on file     Inability: Not on file     Transportation needs     Medical: Not on file     Non-medical: Not on file   Tobacco Use     Smoking status: Never Smoker     Smokeless tobacco: Never Used   Substance and Sexual Activity     Alcohol use: Yes     Comment: maybe a drink on weekend     Drug use: No     Sexual activity: Yes     Partners: Female     Birth control/protection: None   Lifestyle     Physical activity     Days per week: Not on file     Minutes per session: Not on file     Stress: Not on file   Relationships     Social connections     Talks on phone: Not on file     Gets together: Not on file     Attends Muslim service: Not on file     Active member of club or organization: Not on file     Attends  meetings of clubs or organizations: Not on file     Relationship status: Not on file     Intimate partner violence     Fear of current or ex partner: Not on file     Emotionally abused: Not on file     Physically abused: Not on file     Forced sexual activity: Not on file   Other Topics Concern     Parent/sibling w/ CABG, MI or angioplasty before 65F 55M? Not Asked   Social History Narrative    Works in finance    Exercise 3-4 days per week     Running 30 minutes and lifting              Family History:     Family History   Problem Relation Age of Onset     Breast Cancer Mother      Arthritis Mother      Hypertension Father             Medications:     Current Outpatient Medications   Medication Sig     ZOLMitriptan (ZOMIG) 5 MG tablet Take 1 tablet (5 mg) by mouth at onset of headache for migraine     No current facility-administered medications for this visit.               Review of Systems:   A comprehensive 10 point review of systems (constitutional, ENT, cardiac, peripheral vascular, lymphatic, respiratory, GI, , Musculoskeletal, skin, Neurological) was performed and found to be negative except as described in this note.     See intake form completed by patient

## 2020-10-22 ENCOUNTER — TRANSFERRED RECORDS (OUTPATIENT)
Dept: HEALTH INFORMATION MANAGEMENT | Facility: CLINIC | Age: 50
End: 2020-10-22

## 2020-11-12 ENCOUNTER — TRANSFERRED RECORDS (OUTPATIENT)
Dept: HEALTH INFORMATION MANAGEMENT | Facility: CLINIC | Age: 50
End: 2020-11-12

## 2020-12-03 ENCOUNTER — DOCUMENTATION ONLY (OUTPATIENT)
Dept: FAMILY MEDICINE | Facility: CLINIC | Age: 50
End: 2020-12-03

## 2020-12-03 DIAGNOSIS — Z00.00 ROUTINE GENERAL MEDICAL EXAMINATION AT A HEALTH CARE FACILITY: Primary | ICD-10-CM

## 2020-12-03 NOTE — PROGRESS NOTES
This patient was scheduled for physical exam today.  He also wanted to have Preoperative exam in anticipation of elective surgery scheduled for 12/18.  It came to our attention that he has recently been diagnosed with COVID 19 and had current persistent URI symptoms.  For the safety of other patients and for the safety of our staff I asked my medical assistant to ask him to reschedule for next week. I would be happy to add him to the schedule next week so that he can be seen in time for his surgery when, in my opinion, it would be safer from an infection control stand point.

## 2020-12-04 NOTE — PROGRESS NOTES
I called this patient at 18:42   I was hoping to connect with him to schedule a visit next week for his preop and flesh out the concern about persistent COVID symptoms  I left him a message apologizing for inconveniencing him this AM  I will try calling him later to get him rescheduled next week when I think it would be safer

## 2020-12-07 NOTE — PROGRESS NOTES
Can you please call this patient and help him get scheduled for a preop this week, it will be safer for him this week visa vi his recent positive covid test    He could be added to the end of my schedule today;  330; I could see him at noon on wed, or at the end of my schedule on Wed or I could see him at 9:30 on Thursday or noon on Thursday   Noon on Friday or even 4:30 on Friday

## 2020-12-10 DIAGNOSIS — Z00.00 ROUTINE GENERAL MEDICAL EXAMINATION AT A HEALTH CARE FACILITY: ICD-10-CM

## 2020-12-10 LAB
ALBUMIN SERPL-MCNC: 4.1 G/DL (ref 3.4–5)
ALP SERPL-CCNC: 67 U/L (ref 40–150)
ALT SERPL W P-5'-P-CCNC: 45 U/L (ref 0–70)
ANION GAP SERPL CALCULATED.3IONS-SCNC: 3 MMOL/L (ref 3–14)
AST SERPL W P-5'-P-CCNC: 22 U/L (ref 0–45)
BILIRUB SERPL-MCNC: 0.6 MG/DL (ref 0.2–1.3)
BUN SERPL-MCNC: 19 MG/DL (ref 7–30)
CALCIUM SERPL-MCNC: 9.7 MG/DL (ref 8.5–10.1)
CHLORIDE SERPL-SCNC: 108 MMOL/L (ref 94–109)
CHOLEST SERPL-MCNC: 195 MG/DL
CO2 SERPL-SCNC: 29 MMOL/L (ref 20–32)
CREAT SERPL-MCNC: 1.03 MG/DL (ref 0.66–1.25)
ERYTHROCYTE [DISTWIDTH] IN BLOOD BY AUTOMATED COUNT: 12.6 % (ref 10–15)
GFR SERPL CREATININE-BSD FRML MDRD: 83 ML/MIN/{1.73_M2}
GLUCOSE SERPL-MCNC: 106 MG/DL (ref 70–99)
HCT VFR BLD AUTO: 44.4 % (ref 40–53)
HDLC SERPL-MCNC: 55 MG/DL
HGB BLD-MCNC: 15 G/DL (ref 13.3–17.7)
LDLC SERPL CALC-MCNC: 129 MG/DL
MCH RBC QN AUTO: 31.1 PG (ref 26.5–33)
MCHC RBC AUTO-ENTMCNC: 33.8 G/DL (ref 31.5–36.5)
MCV RBC AUTO: 92 FL (ref 78–100)
NONHDLC SERPL-MCNC: 140 MG/DL
PLATELET # BLD AUTO: 257 10E9/L (ref 150–450)
POTASSIUM SERPL-SCNC: 4.3 MMOL/L (ref 3.4–5.3)
PROT SERPL-MCNC: 7.5 G/DL (ref 6.8–8.8)
PSA SERPL-ACNC: 0.6 UG/L (ref 0–4)
RBC # BLD AUTO: 4.83 10E12/L (ref 4.4–5.9)
SODIUM SERPL-SCNC: 140 MMOL/L (ref 133–144)
TRIGL SERPL-MCNC: 54 MG/DL
WBC # BLD AUTO: 4.6 10E9/L (ref 4–11)

## 2020-12-10 PROCEDURE — 85027 COMPLETE CBC AUTOMATED: CPT | Performed by: INTERNAL MEDICINE

## 2020-12-10 PROCEDURE — 80053 COMPREHEN METABOLIC PANEL: CPT | Performed by: INTERNAL MEDICINE

## 2020-12-10 PROCEDURE — G0103 PSA SCREENING: HCPCS | Performed by: INTERNAL MEDICINE

## 2020-12-10 PROCEDURE — 36415 COLL VENOUS BLD VENIPUNCTURE: CPT | Performed by: INTERNAL MEDICINE

## 2020-12-10 PROCEDURE — 80061 LIPID PANEL: CPT | Performed by: INTERNAL MEDICINE

## 2020-12-10 NOTE — LETTER
"December 11, 2020      Thomas Yip  [address] [address]        Dear ,    We are writing to inform you of your test results.    The following letter pertains to your most recent diagnostic tests:     -Your prostate specific antigen (PSA) test result returned normal.     -Liver and gallbladder tests are normal for you. (ALT,AST, Alk phos, bilirubin), kidney function is normal for you (Creatinine, GFR), Sodium is normal, Potassium is normal for you, Calcium is normal for you, Glucose (blood sugar) is mildly elevated, but it is not in the diabetic range.       -Your total cholesterol is 195 which is at your goal of total cholesterol less than 200.     -Your triglycerides are 54 which are at your goal of triglycerides less than 150.     -Your HDL or \"good cholesterol\" is 55 which is at your goal of HDL cholesterol greater than 40.     -Your LDL cholesterol or \"bad cholesterol\" is 129 which is at your goal of LDL cholesterol less than <160.  Your LDL goal is based on your risk factors for artery disease.     -Your complete blood counts including your hemoglobin returned normal for you.       Bottom line:  Your labs remain stable.          Resulted Orders   CBC with platelets   Result Value Ref Range    WBC 4.6 4.0 - 11.0 10e9/L    RBC Count 4.83 4.4 - 5.9 10e12/L    Hemoglobin 15.0 13.3 - 17.7 g/dL    Hematocrit 44.4 40.0 - 53.0 %    MCV 92 78 - 100 fl    MCH 31.1 26.5 - 33.0 pg    MCHC 33.8 31.5 - 36.5 g/dL    RDW 12.6 10.0 - 15.0 %    Platelet Count 257 150 - 450 10e9/L   Comprehensive metabolic panel   Result Value Ref Range    Sodium 140 133 - 144 mmol/L    Potassium 4.3 3.4 - 5.3 mmol/L    Chloride 108 94 - 109 mmol/L    Carbon Dioxide 29 20 - 32 mmol/L    Anion Gap 3 3 - 14 mmol/L    Glucose 106 (H) 70 - 99 mg/dL    Urea Nitrogen 19 7 - 30 mg/dL    Creatinine 1.03 0.66 - 1.25 mg/dL    GFR Estimate 83 >60 mL/min/[1.73_m2]      Comment:      Non  GFR Calc  Starting " 12/18/2018, serum creatinine based estimated GFR (eGFR) will be   calculated using the Chronic Kidney Disease Epidemiology Collaboration   (CKD-EPI) equation.      GFR Estimate If Black >90 >60 mL/min/[1.73_m2]      Comment:       GFR Calc  Starting 12/18/2018, serum creatinine based estimated GFR (eGFR) will be   calculated using the Chronic Kidney Disease Epidemiology Collaboration   (CKD-EPI) equation.      Calcium 9.7 8.5 - 10.1 mg/dL    Bilirubin Total 0.6 0.2 - 1.3 mg/dL    Albumin 4.1 3.4 - 5.0 g/dL    Protein Total 7.5 6.8 - 8.8 g/dL    Alkaline Phosphatase 67 40 - 150 U/L    ALT 45 0 - 70 U/L    AST 22 0 - 45 U/L   Lipid panel reflex to direct LDL Fasting   Result Value Ref Range    Cholesterol 195 <200 mg/dL    Triglycerides 54 <150 mg/dL    HDL Cholesterol 55 >39 mg/dL    LDL Cholesterol Calculated 129 (H) <100 mg/dL      Comment:      Above desirable:  100-129 mg/dl  Borderline High:  130-159 mg/dL  High:             160-189 mg/dL  Very high:       >189 mg/dl      Non HDL Cholesterol 140 (H) <130 mg/dL      Comment:      Above Desirable:  130-159 mg/dl  Borderline high:  160-189 mg/dl  High:             190-219 mg/dl  Very high:       >219 mg/dl     Prostate spec antigen screen   Result Value Ref Range    PSA 0.60 0 - 4 ug/L      Comment:      Assay Method:  Chemiluminescence using Siemens Vista analyzer       If you have any questions or concerns, please call the clinic at the number listed above.       Sincerely,      Haile Solomon MD/ leo de leon

## 2020-12-11 ENCOUNTER — OFFICE VISIT (OUTPATIENT)
Dept: FAMILY MEDICINE | Facility: CLINIC | Age: 50
End: 2020-12-11
Payer: COMMERCIAL

## 2020-12-11 VITALS
OXYGEN SATURATION: 100 % | HEART RATE: 66 BPM | BODY MASS INDEX: 22.62 KG/M2 | SYSTOLIC BLOOD PRESSURE: 130 MMHG | DIASTOLIC BLOOD PRESSURE: 86 MMHG | TEMPERATURE: 98.2 F | WEIGHT: 167 LBS | HEIGHT: 72 IN

## 2020-12-11 DIAGNOSIS — G89.29 CHRONIC PAIN OF RIGHT KNEE: Primary | ICD-10-CM

## 2020-12-11 DIAGNOSIS — M25.561 CHRONIC PAIN OF RIGHT KNEE: Primary | ICD-10-CM

## 2020-12-11 DIAGNOSIS — U07.1 INFECTION DUE TO 2019 NOVEL CORONAVIRUS: ICD-10-CM

## 2020-12-11 PROCEDURE — 99214 OFFICE O/P EST MOD 30 MIN: CPT | Performed by: INTERNAL MEDICINE

## 2020-12-11 ASSESSMENT — MIFFLIN-ST. JEOR: SCORE: 1650.51

## 2020-12-11 NOTE — PROGRESS NOTES
[address] [address]  Phone: [phone]  Primary Provider: Mary Ross  Pre-op Performing Provider: AMRY ROSS    PREOPERATIVE EVALUATION:  Today's date: 12/11/2020    Thomas Yip is a 49 year old male who presents for a preoperative evaluation.    Surgical Information:  Surgery/Procedure: Right Knee Meniscus repair  Surgery Location: [facility]  Surgeon: Dr. Villela  Surgery Date: 12/18/2020  Time of Surgery: 10:30 AM  Where patient plans to recover: At home with family  Fax number for surgical facility: [phone]    Type of Anesthesia Anticipated: General    Subjective     HPI related to upcoming procedure: Right knee pain since injury 31 years ago recent surgery without adequate symptom improvement.  MRI after surgery apparently looked OK, but orthopedist recommended arthroscopy to further evaluate.  This patient reports being able to perform 4 METS of physical activity without chest pain or dyspnea.      Preop Questions 12/11/2020   1. Have you ever had a heart attack or stroke? No   2. Have you ever had surgery on your heart or blood vessels, such as a stent placement, a coronary artery bypass, or surgery on an artery in your head, neck, heart, or legs? No   3. Do you have chest pain with activity? No   4. Do you have a history of  heart failure? No   5. Do you currently have a cold, bronchitis or symptoms of other infection? No   6. Do you have a cough, shortness of breath, or wheezing? No   7. Do you or anyone in your family have previous history of blood clots? No   8. Do you or does anyone in your family have a serious bleeding problem such as prolonged bleeding following surgeries or cuts? No   9. Have you ever had problems with anemia or been told to take iron pills? No   10. Have you had any abnormal blood loss such as black, tarry or bloody stools? No   11. Have you ever had a blood transfusion? No   12. Are you willing  to have a blood transfusion if it is medically needed before, during, or after your surgery? Yes   13. Have you or any of your relatives ever had problems with anesthesia? No   14. Do you have sleep apnea, excessive snoring or daytime drowsiness? No   15. Do you have any artifical heart valves or other implanted medical devices like a pacemaker, defibrillator, or continuous glucose monitor? No   16. Do you have artificial joints? No   17. Are you allergic to latex? No       Health Care Directive:  Patient does not have a Health Care Directive or Living Will: Discussed advance care planning with patient; information given to patient to review.  [id number]}    Review of Systems  Constitutional, neuro, ENT, endocrine, pulmonary, cardiac, gastrointestinal, genitourinary, musculoskeletal, integument and psychiatric systems are negative, except as otherwise noted.    Patient Active Problem List    Diagnosis Date Noted     Migraine without aura and without status migrainosus, not intractable 09/16/2016     Priority: Medium     Myalgia and myositis 09/24/2014     Priority: Medium     Class: Chronic      Past Medical History:   Diagnosis Date     Migraine      Myalgia and myositis 9/24/2014     Past Surgical History:   Procedure Laterality Date     COLONOSCOPY N/A 12/7/2018    Procedure: COMBINED COLONOSCOPY, SINGLE OR MULTIPLE BIOPSY/POLYPECTOMY BY BIOPSY;  Surgeon: Baldemar Salazar MD;  Location:  GI     ORTHOPEDIC SURGERY Right     ACL/MCL/Meniscus     Current Outpatient Medications   Medication Sig Dispense Refill     ZOLMitriptan (ZOMIG) 5 MG tablet Take 1 tablet (5 mg) by mouth at onset of headache for migraine 30 tablet 11       Allergies   Allergen Reactions     No Known Allergies         Social History     Tobacco Use     Smoking status: Never Smoker     Smokeless tobacco: Never Used   Substance Use Topics     Alcohol use: Yes     Comment: maybe a drink on weekend     Family History   Problem Relation Age of Onset      Breast Cancer Mother      Arthritis Mother      Hypertension Father      History   Drug Use No         Objective     /86 (BP Location: Right arm, Cuff Size: Adult Regular)   Pulse 66   Temp 98.2  F (36.8  C) (Temporal)   Ht 1.829 m (6')   Wt 75.8 kg (167 lb)   SpO2 100%   BMI 22.65 kg/m      Physical Exam    GENERAL APPEARANCE: healthy, alert and no distress     EYES: EOMI,  PERRL     HENT: ear canals and TM's normal and nose and mouth without ulcers or lesions     NECK: no adenopathy, no asymmetry, masses, or scars and thyroid normal to palpation     RESP: lungs clear to auscultation - no rales, rhonchi or wheezes     CV: regular rates and rhythm, normal S1 S2, no S3 or S4 and no murmur, click or rub     ABDOMEN:  soft, nontender, no HSM or masses and bowel sounds normal     MS: extremities normal- no gross deformities noted, no evidence of inflammation in joints, FROM in all extremities.     SKIN: no suspicious lesions or rashes     NEURO: Normal strength and tone, sensory exam grossly normal, mentation intact and speech normal     PSYCH: mentation appears normal. and affect normal/bright     LYMPHATICS: No cervical adenopathy  He declined a rectal exam    Recent Labs   Lab Test 12/10/20  0756 09/27/19  0941   HGB 15.0 15.6    200    139   POTASSIUM 4.3 4.4   CR 1.03 0.98   A1C  --  5.4        Diagnostics:      Revised Cardiac Risk Index (RCRI):  The patient has the following serious cardiovascular risks for perioperative complications:   - No serious cardiac risks = 0 points     RCRI Interpretation: 0 points: Class I (very low risk - 0.4% complication rate)           Assessment & Plan   The proposed surgical procedure is considered LOW risk.    Chronic pain of right knee  Suitable candidate for surgery and anesthesia     Infection due to 2019 novel coronavirus    - COVID-19 Virus (Coronavirus) Antibody & Titer Reflex; Future     Reviewed recent preventative lab results   I recommended  shingrix    He requested COVID antibody testing    Risks and Recommendations:  The patient has the following additional risks and recommendations for perioperative complications:   -     Medication Instructions:  Patient is on no chronic medications    RECOMMENDATION:  APPROVAL GIVEN to proceed with proposed procedure, without further diagnostic evaluation.    Signed Electronically by: Haile Solomon MD    Copy of this evaluation report is provided to requesting physician.    [facility] Preop Guidelines    Revised Cardiac Risk Index

## 2020-12-11 NOTE — RESULT ENCOUNTER NOTE
"The following letter pertains to your most recent diagnostic tests:    -Your prostate specific antigen (PSA) test result returned normal.     -Liver and gallbladder tests are normal for you. (ALT,AST, Alk phos, bilirubin), kidney function is normal for you (Creatinine, GFR), Sodium is normal, Potassium is normal for you, Calcium is normal for you, Glucose (blood sugar) is mildly elevated, but it is not in the diabetic range.      -Your total cholesterol is 195 which is at your goal of total cholesterol less than 200.    -Your triglycerides are 54 which are at your goal of triglycerides less than 150.    -Your HDL or \"good cholesterol\" is 55 which is at your goal of HDL cholesterol greater than 40.    -Your LDL cholesterol or \"bad cholesterol\" is 129 which is at your goal of LDL cholesterol less than <160.  Your LDL goal is based on your risk factors for artery disease.     -Your complete blood counts including your hemoglobin returned normal for you.       Bottom line:  Your labs remain stable.          Sincerely,    Dr. Solomon"

## 2020-12-16 DIAGNOSIS — G43.009 MIGRAINE WITHOUT AURA AND WITHOUT STATUS MIGRAINOSUS, NOT INTRACTABLE: ICD-10-CM

## 2020-12-17 ENCOUNTER — MYC REFILL (OUTPATIENT)
Dept: FAMILY MEDICINE | Facility: CLINIC | Age: 50
End: 2020-12-17

## 2020-12-17 DIAGNOSIS — G43.009 MIGRAINE WITHOUT AURA AND WITHOUT STATUS MIGRAINOSUS, NOT INTRACTABLE: ICD-10-CM

## 2020-12-17 RX ORDER — ZOLMITRIPTAN 5 MG/1
5 TABLET, FILM COATED ORAL
Qty: 30 TABLET | Refills: 11 | Status: SHIPPED | OUTPATIENT
Start: 2020-12-17 | End: 2021-10-11

## 2020-12-17 RX ORDER — ZOLMITRIPTAN 5 MG/1
5 TABLET, FILM COATED ORAL
Qty: 30 TABLET | Refills: 11 | Status: CANCELLED | OUTPATIENT
Start: 2020-12-17

## 2020-12-17 NOTE — TELEPHONE ENCOUNTER
ZOLMITRIPTAN 5 MG TABLETS    Summary: Take 1 tablet (5 mg) by mouth at onset of headache for migraine, Disp-30 tablet, R-11, E-Prescribe   Dose, Route, Frequency: 5 mg, Oral, AT ONSET OF HEADACHE  Start: 12/3/2019  Ord/Sold: 12/3/2019

## 2020-12-18 DIAGNOSIS — G43.009 MIGRAINE WITHOUT AURA AND WITHOUT STATUS MIGRAINOSUS, NOT INTRACTABLE: ICD-10-CM

## 2020-12-18 RX ORDER — ZOLMITRIPTAN 5 MG/1
5 TABLET, FILM COATED ORAL
Qty: 30 TABLET | Refills: 11 | Status: CANCELLED | OUTPATIENT
Start: 2020-12-18

## 2020-12-18 NOTE — TELEPHONE ENCOUNTER
ZOLMitriptan (ZOMIG) 5 MG tablet 30 tablet 11 12/17/2020  No   Sig - Route: Take 1 tablet (5 mg) by mouth at onset of headache for migraine - Oral   Sent to pharmacy as: ZOLMitriptan 5 MG Oral Tablet (ZOMIG)   Class: E-Prescribe   Order: [id number]   E-Prescribing Status: Receipt confirmed by pharmacy (12/17/2020  4:48 PM CST)   Printout Tracking    External Result Report   Pharmacy    [id number], [place] - [address] AT [facility] [address]     Rx was approved yesterday    Roxanne SAMAYOA RN     no chest pain, no cough, and no shortness of breath.

## 2020-12-18 NOTE — TELEPHONE ENCOUNTER
Reason for call:  Medication   If this is a refill request, has the caller requested the refill from the pharmacy already? No  Will the patient be using a [facility]? No    Name of the pharmacy and phone number for the current request:     [id number] - [place], [place] - [address] AT [facility] [address]    Phone:[phone]    Name of the medication requested:     ZOLMitriptan (ZOMIG) 5 MG tablet    Other request: per patient , will like to have this medication filled today    Phone number to reach patient:  Home number on file [phone] (home)    Best Time:  Anytime     Can we leave a detailed message on this number?  NO    Travel screening: Not Applicable

## 2020-12-23 DIAGNOSIS — U07.1 INFECTION DUE TO 2019 NOVEL CORONAVIRUS: ICD-10-CM

## 2020-12-23 PROCEDURE — 86769 SARS-COV-2 COVID-19 ANTIBODY: CPT | Performed by: INTERNAL MEDICINE

## 2020-12-23 PROCEDURE — 36415 COLL VENOUS BLD VENIPUNCTURE: CPT | Performed by: INTERNAL MEDICINE

## 2020-12-23 NOTE — LETTER
December 31, 2020      Thomas Yip  [address] [address]        Dear ,    We are writing to inform you of your test results.    The following letter pertains to your most recent diagnostic tests:     As we would have expected due to your recent infection, COVID antibodies are present.         If you have any questions or concerns, please call the clinic at the number listed above.       Sincerely,      Haile Solomon MD/ leo de leon                                       Resulted Orders   COVID-19 Virus (Coronavirus) Antibody & Titer Reflex   Result Value Ref Range    COVID-19 Antibody Screen Positive       Comment:      Antibodies to COVID-19 detected, which may be due to past or current   infection.      COVID-19 Antibody, IgG Titer 1:800       Comment:      Qualitative screen for total antibodies to COVID-19 (SARS-CoV-2) with   semi-quantitative measurement of IgG COVID-19 antibodies by endpoint titer.    COVID-19 antibodies may be elevated due to a past or current infection.  Negative results do not rule out COVID-19 infection.  Results from antibody   testing should not be used as the sole basis to diagnose or exclude SARS-CoV-2   infection or to inform infection status.  COVID-19 PCR test should be ordered   if current infection is suspected.  False positive results may occur in rare   cases due to cross-reacting antibodies.  This test was developed and its performance characteristics determined by the   [facility] ([facility]),   which is regulated under CLIA as qualified to perform high-complexity testing.    This test has not been reviewed by the FDA.  Testing performed by [facility], [facility], [address], [address], [place] [address]

## 2020-12-26 LAB
COVID-19 SPIKE RBD ABY TITER: NORMAL
COVID-19 SPIKE RBD ABY: POSITIVE

## 2020-12-27 ENCOUNTER — HEALTH MAINTENANCE LETTER (OUTPATIENT)
Age: 50
End: 2020-12-27

## 2020-12-27 NOTE — RESULT ENCOUNTER NOTE
The following letter pertains to your most recent diagnostic tests:    As we would expect, your COVID -19 antibody test is negative.            Sincerely,    Dr. Solomon

## 2020-12-31 NOTE — RESULT ENCOUNTER NOTE
The following letter pertains to your most recent diagnostic tests:    As we would have expected due to your recent infection, COVID antibodies are present.      Sincerely,    Dr. Solomon

## 2021-02-15 ENCOUNTER — OFFICE VISIT (OUTPATIENT)
Dept: FAMILY MEDICINE | Facility: CLINIC | Age: 51
End: 2021-02-15
Payer: COMMERCIAL

## 2021-02-15 VITALS
HEIGHT: 72 IN | WEIGHT: 166 LBS | OXYGEN SATURATION: 100 % | BODY MASS INDEX: 22.48 KG/M2 | HEART RATE: 80 BPM | SYSTOLIC BLOOD PRESSURE: 138 MMHG | DIASTOLIC BLOOD PRESSURE: 87 MMHG | TEMPERATURE: 96.4 F

## 2021-02-15 DIAGNOSIS — G89.29 CHRONIC PAIN OF RIGHT KNEE: ICD-10-CM

## 2021-02-15 DIAGNOSIS — M25.561 CHRONIC PAIN OF RIGHT KNEE: ICD-10-CM

## 2021-02-15 DIAGNOSIS — Z01.818 PRE-OP EXAM: Primary | ICD-10-CM

## 2021-02-15 PROCEDURE — 99213 OFFICE O/P EST LOW 20 MIN: CPT | Performed by: INTERNAL MEDICINE

## 2021-02-15 ASSESSMENT — MIFFLIN-ST. JEOR: SCORE: 1641.1

## 2021-02-15 NOTE — PROGRESS NOTES
[address] [address]  Phone: [phone]  Primary Provider: Haile Solomon      PREOPERATIVE EVALUATION:  Today's date: 2/15/2021    Thomas Yip is a 50 year old male who presents for a preoperative evaluation.    Surgical Information:  Surgery/Procedure: Right Knee Scope  Surgery Location: [facility]   Surgeon: Dr. Villela   Surgery Date: 3/5/2021  Time of Surgery: 10:30 am   Where patient plans to recover: At home with family  Fax number for surgical facility: [phone]    Type of Anesthesia Anticipated: General    Assessment & Plan     The proposed surgical procedure is considered LOW risk.    Pre-op exam  Chronic pain of right knee  Patient is a low risk candidate and may proceed with procedure as planned.  Recent labs reviewed. No new health concerns.    RECOMMENDATION:  APPROVAL GIVEN to proceed with proposed procedure, without further diagnostic evaluation.    Subjective     HPI related to upcoming procedure:     Patient has scope planned for R knee. Hx of meniscal repair. He had pre op already in Dec but had to postpone surgery for scheduling purposes. Reports no change in his health status. Feels well. Nonsmoker. No previous lung/cardiac hx that he reports. Denies heavy alcohol use. Denies f/c. Denies cp/sob at rest or with activity. Able to walk four blocks without cp/sob.    Preop Questions 2/9/2021   1. Have you ever had a heart attack or stroke? No   2. Have you ever had surgery on your heart or blood vessels, such as a stent placement, a coronary artery bypass, or surgery on an artery in your head, neck, heart, or legs? No   3. Do you have chest pain with activity? No   4. Do you have a history of  heart failure? No   5. Do you currently have a cold, bronchitis or symptoms of other infection? No   6. Do you have a cough, shortness of breath, or wheezing? No   7. Do you or anyone in your family have previous history of blood clots? No   8. Do  you or does anyone in your family have a serious bleeding problem such as prolonged bleeding following surgeries or cuts? No   9. Have you ever had problems with anemia or been told to take iron pills? No   10. Have you had any abnormal blood loss such as black, tarry or bloody stools? No   11. Have you ever had a blood transfusion? No   12. Are you willing to have a blood transfusion if it is medically needed before, during, or after your surgery? Yes   13. Have you or any of your relatives ever had problems with anesthesia? No   14. Do you have sleep apnea, excessive snoring or daytime drowsiness? No   15. Do you have any artifical heart valves or other implanted medical devices like a pacemaker, defibrillator, or continuous glucose monitor? No   16. Do you have artificial joints? No   17. Are you allergic to latex? No         Review of Systems  Constitutional, neuro, ENT, endocrine, pulmonary, cardiac, gastrointestinal, genitourinary, musculoskeletal, integument and psychiatric systems are negative, except as otherwise noted.    Patient Active Problem List    Diagnosis Date Noted     Migraine without aura and without status migrainosus, not intractable 09/16/2016     Priority: Medium      Past Medical History:   Diagnosis Date     Migraine      Myalgia and myositis 9/24/2014     Past Surgical History:   Procedure Laterality Date     COLONOSCOPY N/A 12/7/2018    Procedure: COMBINED COLONOSCOPY, SINGLE OR MULTIPLE BIOPSY/POLYPECTOMY BY BIOPSY;  Surgeon: Baldemar Salazar MD;  Location:  GI     ORTHOPEDIC SURGERY Right     ACL/MCL/Meniscus     Current Outpatient Medications   Medication Sig Dispense Refill     ZOLMitriptan (ZOMIG) 5 MG tablet Take 1 tablet (5 mg) by mouth at onset of headache for migraine 30 tablet 11       Allergies   Allergen Reactions     No Known Allergies         Social History     Tobacco Use     Smoking status: Never Smoker     Smokeless tobacco: Never Used   Substance Use Topics      "Alcohol use: Yes     Comment: maybe a drink on weekend         Objective     /87 (BP Location: Left arm, Patient Position: Sitting)   Pulse 80   Temp 96.4  F (35.8  C) (Temporal)   Ht 1.829 m (6' 0.01\")   Wt 75.3 kg (166 lb)   SpO2 100%   BMI 22.51 kg/m      Physical Exam    GENERAL APPEARANCE: AAOx3, no distress. Well developed.    RESP: Lungs CTA bilaterally. No w/r/r. No distress     CV: RRR, S1/S2 present. No m/r/c.     ABDOMEN:  soft, nontender, no distention. No rebound or guarding.     EXT: No c/c/e in lower extremities b/l. No rashes or deformities noted.    PSYCH: appropriate mood and affect.       Recent Labs   Lab Test 12/10/20  0756 09/27/19  0941   HGB 15.0 15.6    200    139   POTASSIUM 4.3 4.4   CR 1.03 0.98   A1C  --  5.4        Diagnostics:  No labs were ordered during this visit.   No EKG required, no history of coronary heart disease, significant arrhythmia, peripheral arterial disease or other structural heart disease.    Revised Cardiac Risk Index (RCRI):  The patient has the following serious cardiovascular risks for perioperative complications:   - No serious cardiac risks = 0 points     RCRI Interpretation: 0 points: Class I (very low risk - 0.4% complication rate)         Signed Electronically by: Iwona Molina DO  Copy of this evaluation report is provided to requesting physician.    [facility] Guidelines    Revised Cardiac Risk Index   "

## 2021-03-15 ENCOUNTER — TRANSFERRED RECORDS (OUTPATIENT)
Dept: HEALTH INFORMATION MANAGEMENT | Facility: CLINIC | Age: 51
End: 2021-03-15

## 2021-05-21 ENCOUNTER — IMMUNIZATION (OUTPATIENT)
Dept: NURSING | Facility: CLINIC | Age: 51
End: 2021-05-21
Payer: COMMERCIAL

## 2021-05-21 PROCEDURE — 91300 PR COVID VAC PFIZER DIL RECON 30 MCG/0.3 ML IM: CPT

## 2021-05-21 PROCEDURE — 0001A PR COVID VAC PFIZER DIL RECON 30 MCG/0.3 ML IM: CPT

## 2021-06-11 ENCOUNTER — IMMUNIZATION (OUTPATIENT)
Dept: NURSING | Facility: CLINIC | Age: 51
End: 2021-06-11
Attending: INTERNAL MEDICINE
Payer: COMMERCIAL

## 2021-06-11 PROCEDURE — 91300 PR COVID VAC PFIZER DIL RECON 30 MCG/0.3 ML IM: CPT

## 2021-06-11 PROCEDURE — 0002A PR COVID VAC PFIZER DIL RECON 30 MCG/0.3 ML IM: CPT

## 2021-06-21 ENCOUNTER — OFFICE VISIT (OUTPATIENT)
Dept: FAMILY MEDICINE | Facility: CLINIC | Age: 51
End: 2021-06-21
Payer: COMMERCIAL

## 2021-06-21 VITALS
OXYGEN SATURATION: 100 % | SYSTOLIC BLOOD PRESSURE: 130 MMHG | HEART RATE: 67 BPM | WEIGHT: 166 LBS | BODY MASS INDEX: 22.48 KG/M2 | HEIGHT: 72 IN | TEMPERATURE: 96.6 F | DIASTOLIC BLOOD PRESSURE: 84 MMHG

## 2021-06-21 DIAGNOSIS — M54.6 ACUTE MIDLINE THORACIC BACK PAIN: Primary | ICD-10-CM

## 2021-06-21 PROCEDURE — 99214 OFFICE O/P EST MOD 30 MIN: CPT | Performed by: INTERNAL MEDICINE

## 2021-06-21 ASSESSMENT — MIFFLIN-ST. JEOR: SCORE: 1640.97

## 2021-06-21 NOTE — PROGRESS NOTES
"    Assessment & Plan   Problem List Items Addressed This Visit     None      Visit Diagnoses     Acute midline thoracic back pain    -  Primary         Discussed differential diagnosis of thoracic back pain that is constant.  Wells score was 0, has no history of being bedridden for more than 3 days, no prior history of DVTs, no family history of DVTs or PE.  Denies any pleuritic chest pain or pain with taking deep breaths.  No substernal chest pain, radiation of the pain; pain is dull constant on his back that is tolerable, he did not take any pain pills even.  His pulse ox is 100% normal.  Patient not in any distress.  We advised we could do a D-dimer but there is a chance that can be false positive versus watchful observation for the next 24 to 48 hours which he preferred.  Advised to apply local heat/warm compressors, can apply Biofreeze gel or Aspercreme and avoid any strenuous involving upper body activities with weightlifting for now.  Follow-up immediately for any worsening of the pain, pleuritic component of the pain, difficulty breathing or other symptoms.   Also advised can take Motrin or Advil as needed for the pain.  Patient prefers not to take any analgesics at this time.         Work on weight loss  Regular exercise  See Patient Instructions    No follow-ups on file.    Braden Reynolds MD  [facility] ANTHONY wong is a 50 year old who presents for the following health issues     HPI     History of middle back pain, same pain level ~4-5/10, not related to movement, does work out \"no new exercise activities\" changes wts a bit    No worse pain with deep breath, no cough, no fever, no uri sx's preceding    Pain/discomfort Noticeoable; all the time    Patient reports he had knee surgery/arthroscopic 3 months prior.  No history of DVT     He does not have any preceding URI symptoms or cough.  No hemoptysis.  No other systemic complaints.    Review of Systems   Constitutional, " HEENT, cardiovascular, pulmonary, gi and gu systems are negative, except as otherwise noted.      Objective    /84 (BP Location: Right arm, Patient Position: Chair, Cuff Size: Adult Regular)   Pulse 67   Temp 96.6  F (35.9  C) (Temporal)   Ht 1.829 m (6')   Wt 75.3 kg (166 lb)   SpO2 100%   BMI 22.51 kg/m    Body mass index is 22.51 kg/m .  Physical Exam   GENERAL: healthy, alert and no distress  NECK: no adenopathy, no asymmetry, masses, or scars and thyroid normal to palpation  RESP: lungs clear to auscultation - no rales, rhonchi or wheezes  CV: regular rate and rhythm, normal S1 S2, no S3 or S4, no murmur, click or rub, no peripheral edema and peripheral pulses strong  MS: no gross musculoskeletal defects noted, no edema.  Has slight tenderness in the midthoracic left paravertebral muscles.  NEURO: Normal strength and tone, mentation intact and speech normal    Orders Only on 12/23/2020   Component Date Value Ref Range Status     COVID-19 Catracho RBD Nadine 12/23/2020 Positive   Final    Comment: Antibodies to COVID-19 detected, which may be due to past or current   infection.       COVID-19 Catracho RBD Nadine Titer 12/23/2020 1:800   Final    Comment: Qualitative screen for total antibodies to COVID-19 (SARS-CoV-2) with   semi-quantitative measurement of IgG COVID-19 antibodies by endpoint titer.    COVID-19 antibodies may be elevated due to a past or current infection.  Negative results do not rule out COVID-19 infection.  Results from antibody   testing should not be used as the sole basis to diagnose or exclude SARS-CoV-2   infection or to inform infection status.  COVID-19 PCR test should be ordered   if current infection is suspected.  False positive results may occur in rare   cases due to cross-reacting antibodies.  This test was developed and its performance characteristics determined by the   [facility] ([facility]),   which is regulated under CLIA  as qualified to perform high-complexity testing.    This test has not been reviewed by the FDA.  Testing performed by [facility], [facility], [address], [address], [place] [address]

## 2021-09-24 ENCOUNTER — LAB (OUTPATIENT)
Dept: LAB | Facility: CLINIC | Age: 51
End: 2021-09-24
Payer: COMMERCIAL

## 2021-09-24 DIAGNOSIS — Z00.00 ROUTINE GENERAL MEDICAL EXAMINATION AT A HEALTH CARE FACILITY: ICD-10-CM

## 2021-09-24 LAB
ALBUMIN SERPL-MCNC: 4 G/DL (ref 3.4–5)
ALP SERPL-CCNC: 59 U/L (ref 40–150)
ALT SERPL W P-5'-P-CCNC: 32 U/L (ref 0–70)
ANION GAP SERPL CALCULATED.3IONS-SCNC: 3 MMOL/L (ref 3–14)
AST SERPL W P-5'-P-CCNC: 18 U/L (ref 0–45)
BILIRUB SERPL-MCNC: 0.7 MG/DL (ref 0.2–1.3)
BUN SERPL-MCNC: 20 MG/DL (ref 7–30)
CALCIUM SERPL-MCNC: 8.7 MG/DL (ref 8.5–10.1)
CHLORIDE BLD-SCNC: 107 MMOL/L (ref 94–109)
CHOLEST SERPL-MCNC: 196 MG/DL
CO2 SERPL-SCNC: 30 MMOL/L (ref 20–32)
CREAT SERPL-MCNC: 0.99 MG/DL (ref 0.66–1.25)
ERYTHROCYTE [DISTWIDTH] IN BLOOD BY AUTOMATED COUNT: 12.5 % (ref 10–15)
FASTING STATUS PATIENT QL REPORTED: YES
GFR SERPL CREATININE-BSD FRML MDRD: 87 ML/MIN/1.73M2
GLUCOSE BLD-MCNC: 93 MG/DL (ref 70–99)
HCT VFR BLD AUTO: 43.7 % (ref 40–53)
HDLC SERPL-MCNC: 57 MG/DL
HGB BLD-MCNC: 15 G/DL (ref 13.3–17.7)
LDLC SERPL CALC-MCNC: 129 MG/DL
MCH RBC QN AUTO: 31.4 PG (ref 26.5–33)
MCHC RBC AUTO-ENTMCNC: 34.3 G/DL (ref 31.5–36.5)
MCV RBC AUTO: 92 FL (ref 78–100)
NONHDLC SERPL-MCNC: 139 MG/DL
PLATELET # BLD AUTO: 205 10E3/UL (ref 150–450)
POTASSIUM BLD-SCNC: 4.1 MMOL/L (ref 3.4–5.3)
PROT SERPL-MCNC: 7 G/DL (ref 6.8–8.8)
PSA SERPL-MCNC: 0.54 UG/L (ref 0–4)
RBC # BLD AUTO: 4.77 10E6/UL (ref 4.4–5.9)
SODIUM SERPL-SCNC: 140 MMOL/L (ref 133–144)
TRIGL SERPL-MCNC: 51 MG/DL
WBC # BLD AUTO: 4.2 10E3/UL (ref 4–11)

## 2021-09-24 PROCEDURE — 36415 COLL VENOUS BLD VENIPUNCTURE: CPT

## 2021-09-24 PROCEDURE — G0103 PSA SCREENING: HCPCS

## 2021-09-24 PROCEDURE — 80061 LIPID PANEL: CPT

## 2021-09-24 PROCEDURE — 80053 COMPREHEN METABOLIC PANEL: CPT

## 2021-09-24 PROCEDURE — 85027 COMPLETE CBC AUTOMATED: CPT

## 2021-09-24 NOTE — LETTER
September 27, 2021      Thomas Yip  [address]  [place] [address]        Dear ,    We are writing to inform you of your test results.    The following letter pertains to your most recent diagnostic tests:     The cholesterol panel is essentially unchanged from when it was last checked 9 months ago.     -Liver and gallbladder tests are normal for you. (ALT,AST, Alk phos, bilirubin), kidney function is normal for you (Creatinine, GFR), Sodium is normal, Potassium is normal for you, Calcium is normal for you, Glucose (blood sugar) is normal for you.       -Your prostate specific antigen (PSA) test result returned normal.     -Your complete blood counts including your hemoglobin returned normal for you.             Bottom line:  Lab results are stable.           Sincerely,     Dr. Solomon     The 10-year ASCVD risk score (Karena PRESLEY Jr., et al., 2013) is: 3.7%     Values used to calculate the score:       Age: 50 years       Sex: Male       Is Non- : No       Diabetic: No       Tobacco smoker: No       Systolic Blood Pressure: 130 mmHg       Is BP treated: No       HDL Cholesterol: 57 mg/dL       Total Cholesterol: 196 mg/dL       Resulted Orders   Lipid panel reflex to direct LDL Fasting   Result Value Ref Range    Cholesterol 196 <200 mg/dL    Triglycerides 51 <150 mg/dL    Direct Measure HDL 57 >=40 mg/dL    LDL Cholesterol Calculated 129 (H) <=100 mg/dL    Non HDL Cholesterol 139 (H) <130 mg/dL    Patient Fasting > 8hrs? Yes     Narrative    Cholesterol  Desirable:  <200 mg/dL    Triglycerides  Normal:  Less than 150 mg/dL  Borderline High:  150-199 mg/dL  High:  200-499 mg/dL  Very High:  Greater than or equal to 500 mg/dL    Direct Measure HDL  Female:  Greater than or equal to 50 mg/dL   Male:  Greater than or equal to 40 mg/dL    LDL Cholesterol  Desirable:  <100mg/dL  Above Desirable:  100-129 mg/dL   Borderline High:  130-159 mg/dL   High:  160-189 mg/dL   Very High:  >=  190 mg/dL    Non HDL Cholesterol  Desirable:  130 mg/dL  Above Desirable:  130-159 mg/dL  Borderline High:  160-189 mg/dL  High:  190-219 mg/dL  Very High:  Greater than or equal to 220 mg/dL   Comprehensive metabolic panel (BMP + Alb, Alk Phos, ALT, AST, Total. Bili, TP)   Result Value Ref Range    Sodium 140 133 - 144 mmol/L    Potassium 4.1 3.4 - 5.3 mmol/L    Chloride 107 94 - 109 mmol/L    Carbon Dioxide (CO2) 30 20 - 32 mmol/L    Anion Gap 3 3 - 14 mmol/L    Urea Nitrogen 20 7 - 30 mg/dL    Creatinine 0.99 0.66 - 1.25 mg/dL    Calcium 8.7 8.5 - 10.1 mg/dL    Glucose 93 70 - 99 mg/dL    Alkaline Phosphatase 59 40 - 150 U/L    AST 18 0 - 45 U/L    ALT 32 0 - 70 U/L    Protein Total 7.0 6.8 - 8.8 g/dL    Albumin 4.0 3.4 - 5.0 g/dL    Bilirubin Total 0.7 0.2 - 1.3 mg/dL    GFR Estimate 87 >60 mL/min/1.73m2      Comment:      As of July 11, 2021, eGFR is calculated by the CKD-EPI creatinine equation, without race adjustment. eGFR can be influenced by muscle mass, exercise, and diet. The reported eGFR is an estimation only and is only applicable if the renal function is stable.   CBC with platelets   Result Value Ref Range    WBC Count 4.2 4.0 - 11.0 10e3/uL    RBC Count 4.77 4.40 - 5.90 10e6/uL    Hemoglobin 15.0 13.3 - 17.7 g/dL    Hematocrit 43.7 40.0 - 53.0 %    MCV 92 78 - 100 fL    MCH 31.4 26.5 - 33.0 pg    MCHC 34.3 31.5 - 36.5 g/dL    RDW 12.5 10.0 - 15.0 %    Platelet Count 205 150 - 450 10e3/uL   PSA, screen   Result Value Ref Range    Prostate Specific Antigen Screen 0.54 0.00 - 4.00 ug/L       If you have any questions or concerns, please call the clinic at the number listed above.       Sincerely,      Haile Solomon MD

## 2021-09-24 NOTE — RESULT ENCOUNTER NOTE
The following letter pertains to your most recent diagnostic tests:    The cholesterol panel is essentially unchanged from when it was last checked 9 months ago.     -Liver and gallbladder tests are normal for you. (ALT,AST, Alk phos, bilirubin), kidney function is normal for you (Creatinine, GFR), Sodium is normal, Potassium is normal for you, Calcium is normal for you, Glucose (blood sugar) is normal for you.      -Your prostate specific antigen (PSA) test result returned normal.     -Your complete blood counts including your hemoglobin returned normal for you.           Bottom line:  Lab results are stable.          Sincerely,    Dr. Solomon    The 10-year ASCVD risk score (Karenabrenton PRESLEY Jr., et al., 2013) is: 3.7%    Values used to calculate the score:      Age: 50 years      Sex: Male      Is Non- : No      Diabetic: No      Tobacco smoker: No      Systolic Blood Pressure: 130 mmHg      Is BP treated: No      HDL Cholesterol: 57 mg/dL      Total Cholesterol: 196 mg/dL

## 2021-10-04 ENCOUNTER — HEALTH MAINTENANCE LETTER (OUTPATIENT)
Age: 51
End: 2021-10-04

## 2021-10-11 ENCOUNTER — OFFICE VISIT (OUTPATIENT)
Dept: FAMILY MEDICINE | Facility: CLINIC | Age: 51
End: 2021-10-11
Payer: COMMERCIAL

## 2021-10-11 VITALS
SYSTOLIC BLOOD PRESSURE: 135 MMHG | HEART RATE: 65 BPM | DIASTOLIC BLOOD PRESSURE: 85 MMHG | BODY MASS INDEX: 22.58 KG/M2 | TEMPERATURE: 97.1 F | HEIGHT: 72 IN | WEIGHT: 166.7 LBS | RESPIRATION RATE: 12 BRPM | OXYGEN SATURATION: 100 %

## 2021-10-11 DIAGNOSIS — K40.90 RIGHT INGUINAL HERNIA: ICD-10-CM

## 2021-10-11 DIAGNOSIS — Z00.00 ROUTINE GENERAL MEDICAL EXAMINATION AT A HEALTH CARE FACILITY: Primary | ICD-10-CM

## 2021-10-11 DIAGNOSIS — G43.009 MIGRAINE WITHOUT AURA AND WITHOUT STATUS MIGRAINOSUS, NOT INTRACTABLE: ICD-10-CM

## 2021-10-11 PROCEDURE — 99396 PREV VISIT EST AGE 40-64: CPT | Performed by: INTERNAL MEDICINE

## 2021-10-11 RX ORDER — ZOLMITRIPTAN 5 MG/1
5 TABLET, FILM COATED ORAL
Qty: 30 TABLET | Refills: 11 | Status: SHIPPED | OUTPATIENT
Start: 2021-10-11 | End: 2022-10-27

## 2021-10-11 ASSESSMENT — MIFFLIN-ST. JEOR: SCORE: 1644.15

## 2021-10-11 NOTE — PATIENT INSTRUCTIONS
First line migraine prophylaxis options:    Amitriptyline (Elavil)- old fashioned antidepressant, sleepiness is side effect, taken at night    Beta blocker- propranolol - fatigue and low heart rate are potential side effects     Topamax (topiramate) - anti seizure medication - fatigue/sedation and appetite suppression are potential side effects

## 2021-10-11 NOTE — PROGRESS NOTES
SUBJECTIVE:   CC: Thomas Yip is an 50 year old male who presents for preventative health visit.       Patient has been advised of split billing requirements and indicates understanding: Yes  Healthy Habits:     Getting at least 3 servings of Calcium per day:  Yes    Bi-annual eye exam:  Yes    Dental care twice a year:  Yes    Sleep apnea or symptoms of sleep apnea:  None    Diet:  Regular (no restrictions)    Frequency of exercise:  4-5 days/week    Duration of exercise:  30-45 minutes    Taking medications regularly:  Yes    Barriers to taking medications:  None    Medication side effects:  None    PHQ-2 Total Score: 0    Additional concerns today:  No    Migraines 4 times per month  Usually relieved by Zomig   Asks about prophylactic options    Pain in right groin when working out for several weeks without obvious bulge ; no antecedent injury     Today's PHQ-2 Score:   PHQ-2 ( 1999 [employer]) 10/11/2021   Q1: Little interest or pleasure in doing things 0   Q2: Feeling down, depressed or hopeless 0   PHQ-2 Score 0   Q1: Little interest or pleasure in doing things -   Q2: Feeling down, depressed or hopeless -   PHQ-2 Score -       Abuse: Current or Past(Physical, Sexual or Emotional)- No  Do you feel safe in your environment? Yes    Have you ever done Advance Care Planning? (For example, a Health Directive, POLST, or a discussion with a medical provider or your loved ones about your wishes): No, advance care planning information given to patient to review.  Patient plans to discuss their wishes with loved ones or provider.      Social History     Tobacco Use     Smoking status: Never Smoker     Smokeless tobacco: Never Used   Substance Use Topics     Alcohol use: Yes     Comment: maybe a drink on weekend     If you drink alcohol do you typically have >3 drinks per day or >7 drinks per week? No    Alcohol Use 9/27/2019   Prescreen: >3 drinks/day or >7 drinks/week? -   Prescreen: >3 drinks/day or >7 drinks/week? No        Last PSA:   PSA   Date Value Ref Range Status   12/10/2020 0.60 0 - 4 ug/L Final     Comment:     Assay Method:  Chemiluminescence using Siemens Vista analyzer     Prostate Specific Antigen Screen   Date Value Ref Range Status   09/24/2021 0.54 0.00 - 4.00 ug/L Final       Reviewed orders with patient. Reviewed health maintenance and updated orders accordingly - Yes  Patient Active Problem List   Diagnosis     Migraine without aura and without status migrainosus, not intractable     Past Surgical History:   Procedure Laterality Date     COLONOSCOPY N/A 12/7/2018    Procedure: COMBINED COLONOSCOPY, SINGLE OR MULTIPLE BIOPSY/POLYPECTOMY BY BIOPSY;  Surgeon: Baldemar Salazar MD;  Location:  GI     ORTHOPEDIC SURGERY Right     ACL/MCL/Meniscus       Social History     Tobacco Use     Smoking status: Never Smoker     Smokeless tobacco: Never Used   Substance Use Topics     Alcohol use: Yes     Comment: maybe a drink on weekend     Family History   Problem Relation Age of Onset     Breast Cancer Mother      Arthritis Mother      Hypertension Father          Current Outpatient Medications   Medication Sig Dispense Refill     ZOLMitriptan (ZOMIG) 5 MG tablet Take 1 tablet (5 mg) by mouth at onset of headache for migraine 30 tablet 11     Allergies   Allergen Reactions     No Known Allergies        Reviewed and updated as needed this visit by clinical staff  Tobacco  Allergies  Meds              Reviewed and updated as needed this visit by Provider                Past Medical History:   Diagnosis Date     Migraine      Myalgia and myositis 9/24/2014      Past Surgical History:   Procedure Laterality Date     COLONOSCOPY N/A 12/7/2018    Procedure: COMBINED COLONOSCOPY, SINGLE OR MULTIPLE BIOPSY/POLYPECTOMY BY BIOPSY;  Surgeon: Baldemar Salazar MD;  Location:  GI     ORTHOPEDIC SURGERY Right     ACL/MCL/Meniscus       Review of Systems  A 10 organ systems ROS is negative other than any pertinent  positives or negatives previously stated.     OBJECTIVE:   /85 (BP Location: Right arm, Patient Position: Sitting, Cuff Size: Adult Regular)   Pulse 65   Temp 97.1  F (36.2  C) (Temporal)   Resp 12   Ht 1.829 m (6')   Wt 75.6 kg (166 lb 11.2 oz)   SpO2 100%   BMI 22.61 kg/m      Physical Exam  GENERAL: healthy, alert and no distress  EYES: Eyes grossly normal to inspection, PERRL and conjunctivae and sclerae normal  HENT: ear canals and TM's normal,NECK: no adenopathy, no asymmetry, masses, or scars and thyroid normal to palpation  RESP: lungs clear to auscultation - no rales, rhonchi or wheezes  CV: regular rate and rhythm, normal S1 S2, no S3 or S4, no murmur, click or rub, no peripheral edema and peripheral pulses strong  ABDOMEN: soft, nontender, no hepatosplenomegaly, no masses and bowel sounds normal  RECTAL: exam declined by patient  :  Possible small right inguinal hernia , no obvious hernia on left   MS: no gross musculoskeletal defects noted, no edema  SKIN: no suspicious lesions or rashes  NEURO: Normal strength and tone, mentation intact and speech normal  PSYCH: mentation appears normal, affect normal/bright    Labs pending     ASSESSMENT/PLAN:   (Z00.00) Routine general medical examination at a health care facility  (primary encounter diagnosis)      (G43.009) Migraine without aura and without status migrainosus, not intractable  Comment: discussed prophylaxis options ;  See patient instructions ; he will consider them ; he also wants to see neurology   Plan: ZOLMitriptan (ZOMIG) 5 MG tablet, Adult         Neurology Referral            (K40.90) Right inguinal hernia  Comment: can see Dr. Bills for confirmatory exam and to discuss repair if symptoms are persistent   Plan: Adult General Surg Referral              Patient has been advised of split billing requirements and indicates understanding: Yes  COUNSELING:   Reviewed preventive health counseling, as reflected in patient  instructions  Special attention given to:        Regular exercise       Healthy diet/nutrition       Immunizations    Recommend flu shot, he wants to wait until next week              Consider Hep C screening for all patients one time for ages 18-79 years; will do        HIV screeninx in teen years, 1x in adult years, and at intervals if high risk       Colon cancer screening; recheck end of        Prostate cancer screening; PSA    Estimated body mass index is 22.61 kg/m  as calculated from the following:    Height as of this encounter: 1.829 m (6').    Weight as of this encounter: 75.6 kg (166 lb 11.2 oz).         He reports that he has never smoked. He has never used smokeless tobacco.      Counseling Resources:  ATP IV Guidelines  Pooled Cohorts Equation Calculator  FRAX Risk Assessment  ICSI Preventive Guidelines  Dietary Guidelines for Americans,   USDA's MyPlate  ASA Prophylaxis  Lung CA Screening    Haile Solomon MD  [facility]

## 2021-11-09 ENCOUNTER — OFFICE VISIT (OUTPATIENT)
Dept: SURGERY | Facility: CLINIC | Age: 51
End: 2021-11-09
Attending: INTERNAL MEDICINE
Payer: COMMERCIAL

## 2021-11-09 VITALS — DIASTOLIC BLOOD PRESSURE: 70 MMHG | SYSTOLIC BLOOD PRESSURE: 120 MMHG | HEART RATE: 69 BPM

## 2021-11-09 DIAGNOSIS — R10.31 RIGHT GROIN PAIN: ICD-10-CM

## 2021-11-09 PROBLEM — K40.90 RIGHT INGUINAL HERNIA: Status: ACTIVE | Noted: 2021-11-09

## 2021-11-09 PROBLEM — K40.90 RIGHT INGUINAL HERNIA: Status: RESOLVED | Noted: 2021-11-09 | Resolved: 2021-11-09

## 2021-11-09 PROCEDURE — 99244 OFF/OP CNSLTJ NEW/EST MOD 40: CPT | Performed by: SURGERY

## 2021-11-09 NOTE — PROGRESS NOTES
Surgery Consultation, [facility], NAT Bills MD, MD    Thomas Yip MRN# [id number]   YOB: 1970 Age: 50 year old     PCP:  Haile Solomon [phone]    Chief Complaint:  right groin pain    History of Present Illness:  Thomas Yip is a 50 year old male who presented with discomfort in the right groin.  This comes and goes but is usually linked to exercise or exertional activity.  Has not noticed any bulging.  No history of surgery to the area.  Occasionally has noticed a twinge in the left side but it is primarily on the right.  Patient is a runner and occasionally lifts weights.  He is here to discuss these findings.    PMH:  Thomas Yip  has a past medical history of Migraine and Myalgia and myositis (9/24/2014).  PSH:  Thomas Yip  has a past surgical history that includes orthopedic surgery (Right) and Colonoscopy (N/A, 12/7/2018).    Home medications and allergies reviewed.    Social History:  Thomas Yip  reports that he has never smoked. He has never used smokeless tobacco. He reports current alcohol use. He reports that he does not use drugs.  Family History:  Thomas Yip family history includes Arthritis in his mother; Breast Cancer in his mother; Hypertension in his father.    ROS:  The 10 point Review of Systems is negative other than noted in the HPI.    Physical Exam:  Blood pressure 120/70, pulse 69.  0 lbs 0 oz  Thin fit gentleman in no distress.  Converses normally, affect unremarkable  Pupils equal round and reactive to light.  Moist mucous membranes, tongue midline   No cervical lymphadenopathy or thyromegaly.   Lung fields clear, breathing comfortably.   Heart normal sinus rhythm.  No murmurs rubs or gallops.  No obvious neurological deficits  Abdomen soft, nontender, nondistended.  No umbilical hernia appreciated.  Both groins inspected, no areas of fullness or weakness along the spermatic cord or at the internal ring.        Assessment/plan:  Pleasant male with what appears to be an possible right groin strain.  Pain has been minimal and he has not required any pain medication.  I think he should give this more time.  It is possible that with the change of seasons and resultant change in his activity level, his symptoms will completely resolve.  If he starts to have bulging or is limited in his activity by groin pain, then we may need to consider surgery.  I have advised him to call the office if this occurs.  Otherwise, I explained that groin strain or pull is frequently something which would resolve with rest and anti-inflammatories.    Frank Bills M.D.  [facility]  [phone]    Please route or send letter to:  Primary Care Provider (PCP) and Referring Provider

## 2021-12-15 ENCOUNTER — NURSE TRIAGE (OUTPATIENT)
Dept: FAMILY MEDICINE | Facility: CLINIC | Age: 51
End: 2021-12-15
Payer: COMMERCIAL

## 2021-12-15 DIAGNOSIS — Z20.828 EXPOSURE TO THE FLU: Primary | ICD-10-CM

## 2021-12-15 RX ORDER — OSELTAMIVIR PHOSPHATE 75 MG/1
75 CAPSULE ORAL DAILY
Qty: 7 CAPSULE | Refills: 0 | Status: SHIPPED | OUTPATIENT
Start: 2021-12-15 | End: 2021-12-22

## 2021-12-15 NOTE — TELEPHONE ENCOUNTER
Pt called requesting tamiflu for him and his wife since his daughter was dx with influenza A. Pt denies any symptoms.  He wants to have antiviral in case he needs it or to take it prophylactic. Triage advised  pt to schedule a VV or start e-visit but he declines. States he doesn't think appt is necessary and asked triage send message to PCP.     Please advice on order for tamiflu.     Reason for Disposition    Influenza EXPOSURE (Close Contact) within last 48 hours (2 days) and NOT HIGH RISK and strongly requests antiviral medication    Additional Information    Negative: Influenza has been diagnosed by a HCP    Negative: Influenza suspected (i.e., fever and respiratory symptoms; probable influenza exposure)    Negative: Cough and begins > 7 days after influenza EXPOSURE    Negative: Influenza EXPOSURE (close contact) within the last 7 days and fever or any respiratory symptoms (i.e., cough, runny or stuffy nose, sore throat)    Negative: Runny or stuffy nose and begins > 7 days after influenza EXPOSURE    Negative: Sore throat and begins > 7 days after influenza EXPOSURE    Negative: Patient sounds very sick or weak to the triager    Negative: Influenza EXPOSURE within last 48 hours (2 days) and exposed person is HIGH RISK (e.g., age > 64 years, pregnant, HIV+, chronic medical condition)    Negative: Influenza EXPOSURE within last 48 hours (2 days) and exposed person is a health care worker, public health worker, or  (EMS)    Negative: Fever present > 3 days (72 hours)    Protocols used: INFLUENZA EXPOSURE-A-OH

## 2022-06-21 ENCOUNTER — TELEPHONE (OUTPATIENT)
Dept: FAMILY MEDICINE | Facility: CLINIC | Age: 52
End: 2022-06-21

## 2022-06-21 NOTE — TELEPHONE ENCOUNTER
Reason for Call:  Other appointment    Detailed comments: PATIENT WOULD LIKE TO SCHEDULE FOR A PHYSICAL PRIOR TO OPEN ENROLLMENT PERIOD IN November OF 2022. THE PATIENT'S PCP, MARY ROSS MD, NEXT AVAILABLE DATE FOR A PHYSICAL IS 12.21.2022. THE PATIENT WOULD LIKE TO BE SEEN PRIOR TO THAT AND HAS A LAB FOR PHYSICAL SCHEDULED ON 9.26.2022.     Phone Number Patient can be reached at: Home number on file [phone] (home)    Best Time: NA. PATIENT WOULD LIKE A [employer] MESSAGE FOR FOLLOW UP RESPONSE.     Can we leave a detailed message on this number? Not Applicable    Call taken on 6/21/2022 at 11:08 AM by Diony Muñiz

## 2022-06-23 NOTE — TELEPHONE ENCOUNTER
Patient called and scheduled for physical in October.    Fabian Oates CMA on 6/23/2022 at 1:02 PM

## 2022-07-27 NOTE — TELEPHONE ENCOUNTER
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 Please call patient at [phone] to see if he can be seen on a different date before January.  Brynn Adler   [facility]  Central Scheduler

## 2022-08-29 ENCOUNTER — DOCUMENTATION ONLY (OUTPATIENT)
Dept: LAB | Facility: CLINIC | Age: 52
End: 2022-08-29

## 2022-08-29 DIAGNOSIS — Z00.00 ROUTINE GENERAL MEDICAL EXAMINATION AT A HEALTH CARE FACILITY: Primary | ICD-10-CM

## 2022-08-29 NOTE — PROGRESS NOTES
Thomas Yip has an upcoming lab appointment:    Future Appointments   Date Time Provider Department Center   9/26/2022  7:30 AM CS [facility]   10[facility]19/2022  8:30 [facility]     Patient is scheduled for the following lab(s): pt is requesting labs prior to appt. Please order if necessary, thank you.    There is no order available. Please review and place either future orders or HMPO (Review of Health Maintenance Protocol Orders), as appropriate.    Health Maintenance Due   Topic     ANNUAL REVIEW OF HM ORDERS      HEPATITIS C SCREENING      Bridget Reese

## 2022-09-11 ENCOUNTER — HEALTH MAINTENANCE LETTER (OUTPATIENT)
Age: 52
End: 2022-09-11

## 2022-09-26 ENCOUNTER — LAB (OUTPATIENT)
Dept: LAB | Facility: CLINIC | Age: 52
End: 2022-09-26
Payer: COMMERCIAL

## 2022-09-26 DIAGNOSIS — Z00.00 ROUTINE GENERAL MEDICAL EXAMINATION AT A HEALTH CARE FACILITY: ICD-10-CM

## 2022-09-26 LAB
ALBUMIN SERPL-MCNC: 3.8 G/DL (ref 3.4–5)
ALP SERPL-CCNC: 53 U/L (ref 40–150)
ALT SERPL W P-5'-P-CCNC: 35 U/L (ref 0–70)
ANION GAP SERPL CALCULATED.3IONS-SCNC: 4 MMOL/L (ref 3–14)
AST SERPL W P-5'-P-CCNC: 20 U/L (ref 0–45)
BILIRUB SERPL-MCNC: 0.6 MG/DL (ref 0.2–1.3)
BUN SERPL-MCNC: 18 MG/DL (ref 7–30)
CALCIUM SERPL-MCNC: 9.1 MG/DL (ref 8.5–10.1)
CHLORIDE BLD-SCNC: 107 MMOL/L (ref 94–109)
CHOLEST SERPL-MCNC: 205 MG/DL
CO2 SERPL-SCNC: 29 MMOL/L (ref 20–32)
CREAT SERPL-MCNC: 1.03 MG/DL (ref 0.66–1.25)
ERYTHROCYTE [DISTWIDTH] IN BLOOD BY AUTOMATED COUNT: 12.3 % (ref 10–15)
FASTING STATUS PATIENT QL REPORTED: YES
GFR SERPL CREATININE-BSD FRML MDRD: 87 ML/MIN/1.73M2
GLUCOSE BLD-MCNC: 107 MG/DL (ref 70–99)
HCT VFR BLD AUTO: 44.6 % (ref 40–53)
HDLC SERPL-MCNC: 62 MG/DL
HGB BLD-MCNC: 15 G/DL (ref 13.3–17.7)
LDLC SERPL CALC-MCNC: 131 MG/DL
MCH RBC QN AUTO: 31.1 PG (ref 26.5–33)
MCHC RBC AUTO-ENTMCNC: 33.6 G/DL (ref 31.5–36.5)
MCV RBC AUTO: 92 FL (ref 78–100)
NONHDLC SERPL-MCNC: 143 MG/DL
PLATELET # BLD AUTO: 217 10E3/UL (ref 150–450)
POTASSIUM BLD-SCNC: 3.9 MMOL/L (ref 3.4–5.3)
PROT SERPL-MCNC: 7.2 G/DL (ref 6.8–8.8)
PSA SERPL-MCNC: 0.68 UG/L (ref 0–4)
RBC # BLD AUTO: 4.83 10E6/UL (ref 4.4–5.9)
SODIUM SERPL-SCNC: 140 MMOL/L (ref 133–144)
TRIGL SERPL-MCNC: 62 MG/DL
WBC # BLD AUTO: 4.6 10E3/UL (ref 4–11)

## 2022-09-26 PROCEDURE — 36415 COLL VENOUS BLD VENIPUNCTURE: CPT

## 2022-09-26 PROCEDURE — G0103 PSA SCREENING: HCPCS

## 2022-09-26 PROCEDURE — 80053 COMPREHEN METABOLIC PANEL: CPT

## 2022-09-26 PROCEDURE — 80061 LIPID PANEL: CPT

## 2022-09-26 PROCEDURE — 85027 COMPLETE CBC AUTOMATED: CPT

## 2022-09-26 NOTE — RESULT ENCOUNTER NOTE
Dr. Juanito Thornton is out of the office today and I had the opportunity to review your recent labs and a summary of your labs reads as follows:    Your complete blood counts show no sign of anemia, normal white blood cell count and platelets.  Your comprehensive metabolic panel showed normal renal function, normal liver function, and stable fasting blood glucose indicating no evidence of diabetes mellitus.  Your fasting lipid panel show  - normal HDL (good) cholesterol -as your goal is greater than 40  - low LDL (bad) cholesterol as your goal is less than 160  - normal triglyceride levels  Your PSA level is also stable indicating no evidence of prostate cancer      The 10-year ASCVD risk score ([place]brent[place] PRESLEY Jr., et al., 2013) is: 3.5%    Values used to calculate the score:      Age: 51 years      Sex: Male      Is Non- : No      Diabetic: No      Tobacco smoker: No      Systolic Blood Pressure: 120 mmHg      Is BP treated: No      HDL Cholesterol: 62 mg/dL      Total Cholesterol: 205 mg/dL        Sincerely,  Akin Petit MD

## 2022-09-29 ENCOUNTER — MYC MEDICAL ADVICE (OUTPATIENT)
Dept: FAMILY MEDICINE | Facility: CLINIC | Age: 52
End: 2022-09-29

## 2022-10-07 ENCOUNTER — TELEPHONE (OUTPATIENT)
Dept: FAMILY MEDICINE | Facility: CLINIC | Age: 52
End: 2022-10-07

## 2022-10-07 NOTE — TELEPHONE ENCOUNTER
Randi, executive assitant , no [facility] calling to reschedule physical.   Randi state they are open to seeing any provider.   Writer advised that preventitave visit should be with PCP.     Randi expressed verbal understanding but requesting soonest available physical.    Writer scheduled appointment   11/10/2022 1:00 PM (Arrive by 12:40 PM) Olivier Chairez PA-C [facility]         Tamara Harry RN  [facility]

## 2022-10-26 DIAGNOSIS — G43.009 MIGRAINE WITHOUT AURA AND WITHOUT STATUS MIGRAINOSUS, NOT INTRACTABLE: ICD-10-CM

## 2022-10-27 RX ORDER — ZOLMITRIPTAN 5 MG/1
TABLET, FILM COATED ORAL
Qty: 30 TABLET | Refills: 0 | Status: SHIPPED | OUTPATIENT
Start: 2022-10-27 | End: 2022-12-13

## 2022-10-27 NOTE — TELEPHONE ENCOUNTER
Medication is being filled for 1 time refill only due to:  Patient needs to be seen because it has been more than one year since last visit.  Patient is scheduled in 1 month with Dr. Flores. Brynn Irizarry RN

## 2022-12-13 ENCOUNTER — OFFICE VISIT (OUTPATIENT)
Dept: FAMILY MEDICINE | Facility: CLINIC | Age: 52
End: 2022-12-13
Payer: COMMERCIAL

## 2022-12-13 VITALS
RESPIRATION RATE: 12 BRPM | SYSTOLIC BLOOD PRESSURE: 132 MMHG | OXYGEN SATURATION: 99 % | BODY MASS INDEX: 22.13 KG/M2 | DIASTOLIC BLOOD PRESSURE: 88 MMHG | WEIGHT: 167 LBS | HEART RATE: 70 BPM | HEIGHT: 73 IN | TEMPERATURE: 97.7 F

## 2022-12-13 DIAGNOSIS — Z02.89 ENCOUNTER FOR COMPLETION OF FORM WITH PATIENT: ICD-10-CM

## 2022-12-13 DIAGNOSIS — G43.009 MIGRAINE WITHOUT AURA AND WITHOUT STATUS MIGRAINOSUS, NOT INTRACTABLE: ICD-10-CM

## 2022-12-13 DIAGNOSIS — Z00.00 ROUTINE GENERAL MEDICAL EXAMINATION AT A HEALTH CARE FACILITY: Primary | ICD-10-CM

## 2022-12-13 PROCEDURE — 99396 PREV VISIT EST AGE 40-64: CPT | Performed by: INTERNAL MEDICINE

## 2022-12-13 PROCEDURE — 99213 OFFICE O/P EST LOW 20 MIN: CPT | Mod: 25 | Performed by: INTERNAL MEDICINE

## 2022-12-13 RX ORDER — ZOLMITRIPTAN 5 MG/1
TABLET, FILM COATED ORAL
Qty: 30 TABLET | Refills: 11 | Status: SHIPPED | OUTPATIENT
Start: 2022-12-13 | End: 2023-12-04

## 2022-12-13 ASSESSMENT — ENCOUNTER SYMPTOMS
HEMATURIA: 0
DIZZINESS: 0
JOINT SWELLING: 0
CHILLS: 0
EYE PAIN: 0
DIARRHEA: 0
HEADACHES: 0
SHORTNESS OF BREATH: 0
CONSTIPATION: 0
ABDOMINAL PAIN: 0
FEVER: 0
HEARTBURN: 0
NERVOUS/ANXIOUS: 0
FREQUENCY: 0
DYSURIA: 0
SORE THROAT: 0
COUGH: 0
PARESTHESIAS: 0
PALPITATIONS: 0
WEAKNESS: 0
NAUSEA: 0
HEMATOCHEZIA: 0
ARTHRALGIAS: 0
MYALGIAS: 0

## 2022-12-13 ASSESSMENT — PAIN SCALES - GENERAL: PAINLEVEL: NO PAIN (0)

## 2022-12-13 NOTE — PATIENT INSTRUCTIONS
As already all labs were done no need for new labs at this time. Will do the employer paper work as requested.     ==============================      Preventive Health Recommendations  Male Ages 50 - 64    Yearly exam:             See your health care provider every year in order to  o   Review health changes.   o   Discuss preventive care.    o   Review your medicines if your doctor has prescribed any.     Have a cholesterol test every 5 years, or more frequently if you are at risk for high cholesterol/heart disease.     Have a diabetes test (fasting glucose) every three years. If you are at risk for diabetes, you should have this test more often.     Have a colonoscopy at age 50, or have a yearly FIT test (stool test). These exams will check for colon cancer.      Talk with your health care provider about whether or not a prostate cancer screening test (PSA) is right for you.    You should be tested each year for STDs (sexually transmitted diseases), if you re at risk.     Shots: Get a flu shot each year. Get a tetanus shot every 10 years.     Nutrition:    Eat at least 5 servings of fruits and vegetables daily.     Eat whole-grain bread, whole-wheat pasta and brown rice instead of white grains and rice.     Get adequate Calcium and Vitamin D.     Lifestyle    Exercise for at least 150 minutes a week (30 minutes a day, 5 days a week). This will help you control your weight and prevent disease.     Limit alcohol to one drink per day.     No smoking.     Wear sunscreen to prevent skin cancer.     See your dentist every six months for an exam and cleaning.     See your eye doctor every 1 to 2 years.

## 2022-12-13 NOTE — PROGRESS NOTES
SUBJECTIVE:   CC: Thomas is an 51 year old who presents for preventative health visit.       Patient has been advised of split billing requirements and indicates understanding: Yes  Healthy Habits:     Getting at least 3 servings of Calcium per day:  Yes    Bi-annual eye exam:  Yes    Dental care twice a year:  Yes    Sleep apnea or symptoms of sleep apnea:  None    Diet:  Regular (no restrictions)    Frequency of exercise:  4-5 days/week    Duration of exercise:  30-45 minutes    Taking medications regularly:  Yes    Medication side effects:  None    PHQ-2 Total Score: 0    Additional concerns today:  No      Today's PHQ-2 Score:   PHQ-2 ( 1999 Pfizer) 12/13/2022   Q1: Little interest or pleasure in doing things 0   Q2: Feeling down, depressed or hopeless 0   PHQ-2 Score 0   PHQ-2 Total Score (12-17 Years)- Positive if 3 or more points; Administer PHQ-A if positive -   Q1: Little interest or pleasure in doing things Not at all   Q2: Feeling down, depressed or hopeless Not at all   PHQ-2 Score 0           Social History     Tobacco Use     Smoking status: Never     Smokeless tobacco: Never   Substance Use Topics     Alcohol use: Yes     Comment: maybe a drink on weekend     If you drink alcohol do you typically have >3 drinks per day or >7 drinks per week? No    Alcohol Use 12/13/2022   Prescreen: >3 drinks/day or >7 drinks/week? No   Prescreen: >3 drinks/day or >7 drinks/week? -   No flowsheet data found.    Last PSA:   PSA   Date Value Ref Range Status   12/10/2020 0.60 0 - 4 ug/L Final     Comment:     Assay Method:  Chemiluminescence using Siemens Vista analyzer     Prostate Specific Antigen Screen   Date Value Ref Range Status   09/26/2022 0.68 0.00 - 4.00 ug/L Final       Reviewed orders with patient. Reviewed health maintenance and updated orders accordingly - Yes  Lab work is in process  Labs reviewed in EPIC    Reviewed and updated as needed this visit by clinical staff   Tobacco  Allergies  Meds               Reviewed and updated as needed this visit by Provider                 Past Medical History:   Diagnosis Date     Migraine      Myalgia and myositis 9/24/2014      Past Surgical History:   Procedure Laterality Date     COLONOSCOPY N/A 12/7/2018    Procedure: COMBINED COLONOSCOPY, SINGLE OR MULTIPLE BIOPSY/POLYPECTOMY BY BIOPSY;  Surgeon: Baldemar Salazar MD;  Location:  GI     ORTHOPEDIC SURGERY Right     ACL/MCL/Meniscus       Review of Systems   Constitutional: Negative for chills and fever.   HENT: Negative for congestion, ear pain, hearing loss and sore throat.    Eyes: Negative for pain and visual disturbance.   Respiratory: Negative for cough and shortness of breath.    Cardiovascular: Negative for chest pain, palpitations and peripheral edema.   Gastrointestinal: Negative for abdominal pain, constipation, diarrhea, heartburn, hematochezia and nausea.   Genitourinary: Negative for dysuria, frequency, genital sores, hematuria, impotence, penile discharge and urgency.   Musculoskeletal: Negative for arthralgias, joint swelling and myalgias.   Skin: Negative for rash.   Neurological: Negative for dizziness, weakness, headaches and paresthesias.   Psychiatric/Behavioral: Negative for mood changes. The patient is not nervous/anxious.      CONSTITUTIONAL: NEGATIVE for fever, chills, change in weight  INTEGUMENTARY/SKIN: NEGATIVE for worrisome rashes, moles or lesions  EYES: NEGATIVE for vision changes or irritation  ENT: NEGATIVE for ear, mouth and throat problems  RESP: NEGATIVE for significant cough or SOB  CV: NEGATIVE for chest pain, palpitations or peripheral edema  GI: NEGATIVE for nausea, abdominal pain, heartburn, or change in bowel habits   male: negative for dysuria, hematuria, decreased urinary stream, erectile dysfunction, urethral discharge  MUSCULOSKELETAL: NEGATIVE for significant arthralgias or myalgia  NEURO: NEGATIVE for weakness, dizziness or paresthesias  PSYCHIATRIC: NEGATIVE for  "changes in mood or affect    OBJECTIVE:   /88   Pulse 70   Temp 97.7  F (36.5  C) (Temporal)   Resp 12   Ht 1.854 m (6' 1\")   Wt 75.8 kg (167 lb)   SpO2 99%   BMI 22.03 kg/m      Physical Exam  GENERAL: healthy, alert and no distress  EYES: Eyes grossly normal to inspection, PERRL and conjunctivae and sclerae normal  HENT: ear canals and TM's normal, nose and mouth without ulcers or lesions  NECK: no adenopathy, no asymmetry, masses, or scars and thyroid normal to palpation  RESP: lungs clear to auscultation - no rales, rhonchi or wheezes  CV: regular rate and rhythm, normal S1 S2, no S3 or S4, no murmur, click or rub, no peripheral edema and peripheral pulses strong  ABDOMEN: soft, nontender, no hepatosplenomegaly, no masses and bowel sounds normal  MS: no gross musculoskeletal defects noted, no edema  SKIN: no suspicious lesions or rashes  NEURO: Normal strength and tone, mentation intact and speech normal  PSYCH: mentation appears normal, affect normal/bright    Diagnostic Test Results:  Labs reviewed in Epic    ASSESSMENT/PLAN:     Assessment and Plan  1. Routine general medical examination at a health care facility  Pt is new to me, last seen by PCP at [facility] in 10/2021. He is here for annual physical at this time. Currently on medication of Migraine.Last labs in 9/2022 with normal CMP, PSA, CBC. Does have dyslipidemia with LDL borderline at 131.    2. Migraine without aura and without status migrainosus, not intractable   Pt states that he needs only once a week. Requesting for refills enough for whole year.   - ZOLMitriptan (ZOMIG) 5 MG tablet; TAKE 1 TABLET BY MOUTH AT ONSET OF HEADACHE FOR MIGRAINE Strength: 5 mg  Dispense: 30 tablet; Refill: 11    3. Encounter for completion of form with patient  Completed the form of employer as requested.        Patient Instructions   As already all labs were done no need for new labs at this time. Will do the employer paper work as requested. "     ==============================      Preventive Health Recommendations  Male Ages 50 - 64    Yearly exam:             See your health care provider every year in order to  o   Review health changes.   o   Discuss preventive care.    o   Review your medicines if your doctor has prescribed any.     Have a cholesterol test every 5 years, or more frequently if you are at risk for high cholesterol/heart disease.     Have a diabetes test (fasting glucose) every three years. If you are at risk for diabetes, you should have this test more often.     Have a colonoscopy at age 50, or have a yearly FIT test (stool test). These exams will check for colon cancer.      Talk with your health care provider about whether or not a prostate cancer screening test (PSA) is right for you.    You should be tested each year for STDs (sexually transmitted diseases), if you re at risk.     Shots: Get a flu shot each year. Get a tetanus shot every 10 years.     Nutrition:    Eat at least 5 servings of fruits and vegetables daily.     Eat whole-grain bread, whole-wheat pasta and brown rice instead of white grains and rice.     Get adequate Calcium and Vitamin D.     Lifestyle    Exercise for at least 150 minutes a week (30 minutes a day, 5 days a week). This will help you control your weight and prevent disease.     Limit alcohol to one drink per day.     No smoking.     Wear sunscreen to prevent skin cancer.     See your dentist every six months for an exam and cleaning.     See your eye doctor every 1 to 2 years.      Return in about 1 year (around 12/13/2023), or if symptoms worsen or fail to improve, for Preventative Visit.    Mahnaz Flores MD  [facility] CHANDNI MULLIGAN      Patient has been advised of split billing requirements and indicates understanding: Yes      COUNSELING:   Reviewed preventive health counseling, as reflected in patient instructions  Special attention given to:        Regular exercise       Healthy  diet/nutrition       Vision screening       Hearing screening       Immunizations    Declined: Td and Zoster due to Concerns about side effects/safety               Prostate cancer screening        He reports that he has never smoked. He has never used smokeless tobacco.        Mahnaz Flores MD  [facility]

## 2023-08-24 ENCOUNTER — TRANSCRIBE ORDERS (OUTPATIENT)
Dept: GASTROENTEROLOGY | Facility: CLINIC | Age: 53
End: 2023-08-24
Payer: COMMERCIAL

## 2023-08-24 DIAGNOSIS — Z12.11 SCREENING FOR COLON CANCER: Primary | ICD-10-CM

## 2023-08-25 ENCOUNTER — TELEPHONE (OUTPATIENT)
Dept: GASTROENTEROLOGY | Facility: CLINIC | Age: 53
End: 2023-08-25
Payer: COMMERCIAL

## 2023-08-25 ENCOUNTER — HOSPITAL ENCOUNTER (OUTPATIENT)
Facility: CLINIC | Age: 53
End: 2023-08-25
Attending: COLON & RECTAL SURGERY | Admitting: COLON & RECTAL SURGERY
Payer: COMMERCIAL

## 2023-08-25 NOTE — TELEPHONE ENCOUNTER
"Endoscopy Scheduling Screen    Have you had a positive Covid test in the last 14 days?  No    Are you active on MyChart?   Yes    What insurance is in the chart?  Other:  [facility]    - THEY WILL CALL BACK TO UPDATE  [facility]  [phone]    Ordering/Referring Provider: CHANDNI   (If ordering provider performs procedure, schedule with ordering provider unless otherwise instructed. )    BMI: Estimated body mass index is 22.03 kg/m  as calculated from the following:    Height as of 12/13/22: 1.854 m (6' 1\").    Weight as of 12/13/22: 75.8 kg (167 lb).       Sedation Ordered  moderate sedation.   If patient BMI > 50 do not schedule in ASC.    Are you taking any prescription medications for pain?   No    Are you taking methadone or Suboxone?  No      Do you have a history of malignant hyperthermia or adverse reaction to anesthesia?  No      (Females) Are you currently pregnant?   No       Have you been diagnosed or told you have pulmonary hypertension?   No      Do you have an LVAD?  No      Have you been told you have moderate to severe sleep apnea?  No      Have you been told you have COPD, asthma, or any other lung disease?  No      Do you have any heart conditions?  No       Have you ever had or are you awaiting a heart or lung transplant?   No      Have you had a stroke or transient ischemic attack (TIA aka \"mini stroke\" in the last 6 months?   No      Have you been diagnosed with or been told you have cirrhosis of the liver?   No      Are you currently on dialysis?   No      Do you need assistance transferring?   No    BMI: Estimated body mass index is 22.03 kg/m  as calculated from the following:    Height as of 12/13/22: 1.854 m (6' 1\").    Weight as of 12/13/22: 75.8 kg (167 lb).     Is patients BMI > 40 and scheduling location [facility]?  No      Do you take the medication Phentermine, Ozempic or Wegovy?  No      Do you take the medication Naltrexone?  No      Do you take blood thinners?  No      Prep   Are you currently on " dialysis or do you have chronic kidney disease?  No      Do you have a diagnosis of diabetes?  No      Do you have a diagnosis of cystic fibrosis (CF)?  No      On a regular basis do you go 3 -5 days between bowel movements?  No      BMI > 40?  No    Preferred Pharmacy:    [id number] - [place] - [address] AT [facility] [address] [address]  Phone: [phone] Fax: [phone]      Final Scheduling Details   Colonoscopy prep sent?  MiraLAX (No Mag Citrate)    Procedure scheduled  Colonoscopy    Surgeon:  EDWIN     Date of procedure:  12/4     Schedule PAC:   No    Location  [facility]    Sedation   Moderate Sedation    Patient Reminders:   You will receive a call from a Nurse to review instructions and health history.  This assessment must be completed prior to your procedure.  Failure to complete the Nurse assessment may result in the procedure being cancelled.      On the day of your procedure, please designate an adult(s) who can drive you home stay with you for the next 24 hours. The medicines used in the exam will make you sleepy. You will not be able to drive.      You cannot take public transportation, ride share services, or non-medical taxi service without a responsible caregiver.  Medical transport services are allowed with the requirement that a responsible caregiver will receive you at your destination.  We require that drivers and caregivers are confirmed prior to your procedure.

## 2023-11-28 ENCOUNTER — TRANSFERRED RECORDS (OUTPATIENT)
Dept: HEALTH INFORMATION MANAGEMENT | Facility: CLINIC | Age: 53
End: 2023-11-28
Payer: COMMERCIAL

## 2023-12-04 DIAGNOSIS — G43.009 MIGRAINE WITHOUT AURA AND WITHOUT STATUS MIGRAINOSUS, NOT INTRACTABLE: ICD-10-CM

## 2023-12-04 RX ORDER — ZOLMITRIPTAN 5 MG/1
TABLET, FILM COATED ORAL
Qty: 30 TABLET | Refills: 1 | Status: SHIPPED | OUTPATIENT
Start: 2023-12-04

## 2024-02-24 ENCOUNTER — HEALTH MAINTENANCE LETTER (OUTPATIENT)
Age: 54
End: 2024-02-24

## 2024-03-01 ENCOUNTER — TRANSFERRED RECORDS (OUTPATIENT)
Dept: HEALTH INFORMATION MANAGEMENT | Facility: CLINIC | Age: 54
End: 2024-03-01
Payer: COMMERCIAL

## 2024-05-07 LAB
ALT SERPL-CCNC: 46 U/L
CHOLESTEROL (EXTERNAL): 110 MG/DL (ref 50–199)
HDLC SERPL-MCNC: 64 MG/DL
LDL CHOLESTEROL CALCULATED (EXTERNAL): 38 MG/DL
TRIGLYCERIDES (EXTERNAL): 41 MG/DL (ref 10–150)

## 2024-05-14 ENCOUNTER — TRANSFERRED RECORDS (OUTPATIENT)
Dept: HEALTH INFORMATION MANAGEMENT | Facility: CLINIC | Age: 54
End: 2024-05-14
Payer: COMMERCIAL

## 2025-03-09 ENCOUNTER — HEALTH MAINTENANCE LETTER (OUTPATIENT)
Age: 55
End: 2025-03-09

## (undated) DEVICE — SOL WATER IRRIG 1000ML BOTTLE 2F7114

## (undated) RX ORDER — FENTANYL CITRATE 50 UG/ML
INJECTION, SOLUTION INTRAMUSCULAR; INTRAVENOUS
Status: DISPENSED
Start: 2018-12-07